# Patient Record
Sex: FEMALE | Race: WHITE | NOT HISPANIC OR LATINO
[De-identification: names, ages, dates, MRNs, and addresses within clinical notes are randomized per-mention and may not be internally consistent; named-entity substitution may affect disease eponyms.]

---

## 2020-08-31 ENCOUNTER — APPOINTMENT (OUTPATIENT)
Dept: OBGYN | Facility: CLINIC | Age: 50
End: 2020-08-31

## 2020-10-06 ENCOUNTER — APPOINTMENT (OUTPATIENT)
Dept: OBGYN | Facility: CLINIC | Age: 50
End: 2020-10-06
Payer: COMMERCIAL

## 2020-10-06 VITALS
DIASTOLIC BLOOD PRESSURE: 80 MMHG | HEIGHT: 69 IN | WEIGHT: 281 LBS | SYSTOLIC BLOOD PRESSURE: 128 MMHG | TEMPERATURE: 98.6 F | BODY MASS INDEX: 41.62 KG/M2

## 2020-10-06 DIAGNOSIS — Z01.419 ENCOUNTER FOR GYNECOLOGICAL EXAMINATION (GENERAL) (ROUTINE) W/OUT ABNORMAL FINDINGS: ICD-10-CM

## 2020-10-06 PROCEDURE — 99396 PREV VISIT EST AGE 40-64: CPT

## 2020-10-06 NOTE — REVIEW OF SYSTEMS
[Fatigue] : fatigue [Heartburn] : heartburn [Abn Vaginal bleeding] : abnormal vaginal bleeding [Dizziness] : dizziness [Sleep Disturbances] : sleep disturbances [Hot Flashes] : hot flashes [Negative] : Heme/Lymph

## 2020-10-06 NOTE — PLAN
[FreeTextEntry1] : Encourage mammo yearly\par Dexa\par Colonoscopy\par \par Discussed bleeding and uterine cancer risk.  PAP done.  FU for uterine sampling and TVUS

## 2020-10-06 NOTE — HISTORY OF PRESENT ILLNESS
[FreeTextEntry1] : Check up.  Last PAP 5 years ago.  Years late for mammo.  Refused genetic cancer screen.  Bleeding q2 weeks x 6 months.  Obese and not exercising.  Social distancing.

## 2020-10-07 LAB
BASOPHILS # BLD AUTO: 0.05 K/UL
BASOPHILS NFR BLD AUTO: 0.5 %
EOSINOPHIL # BLD AUTO: 0.21 K/UL
EOSINOPHIL NFR BLD AUTO: 2.2 %
ESTIMATED AVERAGE GLUCOSE: 108 MG/DL
HBA1C MFR BLD HPLC: 5.4 %
HCT VFR BLD CALC: 43.3 %
HGB BLD-MCNC: 13.4 G/DL
IMM GRANULOCYTES NFR BLD AUTO: 0.3 %
LYMPHOCYTES # BLD AUTO: 2.19 K/UL
LYMPHOCYTES NFR BLD AUTO: 23.4 %
MAN DIFF?: NORMAL
MCHC RBC-ENTMCNC: 28.6 PG
MCHC RBC-ENTMCNC: 30.9 GM/DL
MCV RBC AUTO: 92.5 FL
MONOCYTES # BLD AUTO: 0.6 K/UL
MONOCYTES NFR BLD AUTO: 6.4 %
NEUTROPHILS # BLD AUTO: 6.26 K/UL
NEUTROPHILS NFR BLD AUTO: 67.2 %
PLATELET # BLD AUTO: 408 K/UL
RBC # BLD: 4.68 M/UL
RBC # FLD: 13.2 %
WBC # FLD AUTO: 9.34 K/UL

## 2020-10-09 LAB
ALBUMIN SERPL ELPH-MCNC: 4.5 G/DL
ALP BLD-CCNC: 124 U/L
ALT SERPL-CCNC: 14 U/L
ANION GAP SERPL CALC-SCNC: 16 MMOL/L
AST SERPL-CCNC: 25 U/L
BILIRUB SERPL-MCNC: 0.3 MG/DL
BUN SERPL-MCNC: 12 MG/DL
CALCIUM SERPL-MCNC: 9.6 MG/DL
CHLORIDE SERPL-SCNC: 102 MMOL/L
CO2 SERPL-SCNC: 24 MMOL/L
CREAT SERPL-MCNC: 0.71 MG/DL
DHEA-S SERPL-MCNC: 86.3 UG/DL
ESTRADIOL SERPL-MCNC: 177 PG/ML
FSH SERPL-MCNC: 19.5 IU/L
GLUCOSE SERPL-MCNC: 86 MG/DL
HPV HIGH+LOW RISK DNA PNL CVX: NOT DETECTED
INSULIN SERPL-MCNC: 9.8 UU/ML
LH SERPL-ACNC: 33.8 IU/L
POTASSIUM SERPL-SCNC: 3.9 MMOL/L
PROGEST SERPL-MCNC: 0.6 NG/ML
PROLACTIN SERPL-MCNC: 23.9 NG/ML
PROT SERPL-MCNC: 7.6 G/DL
SODIUM SERPL-SCNC: 141 MMOL/L
T4 FREE SERPL-MCNC: 1.2 NG/DL
TESTOST SERPL-MCNC: 10.4 NG/DL
TSH SERPL-ACNC: 7.21 UIU/ML

## 2020-10-13 LAB — CYTOLOGY CVX/VAG DOC THIN PREP: NORMAL

## 2020-10-27 ENCOUNTER — APPOINTMENT (OUTPATIENT)
Dept: OBGYN | Facility: CLINIC | Age: 50
End: 2020-10-27
Payer: COMMERCIAL

## 2020-10-27 VITALS
DIASTOLIC BLOOD PRESSURE: 68 MMHG | BODY MASS INDEX: 41.47 KG/M2 | HEIGHT: 69 IN | WEIGHT: 280 LBS | SYSTOLIC BLOOD PRESSURE: 120 MMHG

## 2020-10-27 PROCEDURE — 58120 DILATION AND CURETTAGE: CPT

## 2020-10-27 PROCEDURE — 99072 ADDL SUPL MATRL&STAF TM PHE: CPT

## 2020-11-02 LAB — CORE LAB BIOPSY: NORMAL

## 2020-11-09 ENCOUNTER — APPOINTMENT (OUTPATIENT)
Dept: OBGYN | Facility: CLINIC | Age: 50
End: 2020-11-09
Payer: COMMERCIAL

## 2020-11-09 VITALS
HEIGHT: 69 IN | BODY MASS INDEX: 41.03 KG/M2 | WEIGHT: 277 LBS | SYSTOLIC BLOOD PRESSURE: 134 MMHG | DIASTOLIC BLOOD PRESSURE: 68 MMHG

## 2020-11-09 DIAGNOSIS — N92.6 IRREGULAR MENSTRUATION, UNSPECIFIED: ICD-10-CM

## 2020-11-09 DIAGNOSIS — E06.3 AUTOIMMUNE THYROIDITIS: ICD-10-CM

## 2020-11-09 DIAGNOSIS — N84.0 POLYP OF CORPUS UTERI: ICD-10-CM

## 2020-11-09 PROCEDURE — 76830 TRANSVAGINAL US NON-OB: CPT

## 2020-11-09 PROCEDURE — 99072 ADDL SUPL MATRL&STAF TM PHE: CPT

## 2020-11-09 PROCEDURE — 99213 OFFICE O/P EST LOW 20 MIN: CPT | Mod: 25

## 2020-11-10 LAB
BASOPHILS # BLD AUTO: 0.05 K/UL
BASOPHILS NFR BLD AUTO: 0.7 %
EOSINOPHIL # BLD AUTO: 0.19 K/UL
EOSINOPHIL NFR BLD AUTO: 2.7 %
HCT VFR BLD CALC: 45.5 %
HGB BLD-MCNC: 13.8 G/DL
IMM GRANULOCYTES NFR BLD AUTO: 0.3 %
LYMPHOCYTES # BLD AUTO: 1.85 K/UL
LYMPHOCYTES NFR BLD AUTO: 26.5 %
MAN DIFF?: NORMAL
MCHC RBC-ENTMCNC: 28.5 PG
MCHC RBC-ENTMCNC: 30.3 GM/DL
MCV RBC AUTO: 94 FL
MONOCYTES # BLD AUTO: 0.47 K/UL
MONOCYTES NFR BLD AUTO: 6.7 %
NEUTROPHILS # BLD AUTO: 4.41 K/UL
NEUTROPHILS NFR BLD AUTO: 63.1 %
PLATELET # BLD AUTO: 390 K/UL
RBC # BLD: 4.84 M/UL
RBC # FLD: 13.4 %
WBC # FLD AUTO: 6.99 K/UL

## 2020-11-15 PROBLEM — N92.6 IRREGULAR BLEEDING: Status: ACTIVE | Noted: 2020-10-06

## 2020-11-15 PROBLEM — N84.0 ENDOMETRIAL POLYP: Status: ACTIVE | Noted: 2020-11-15

## 2020-11-15 NOTE — PLAN
[FreeTextEntry1] : Discussed bleeding and polyp.  Discussed d and c hysteroscopy with myosure.  Discussed risks of bleeding infection perforation and damage to adjacent organs.  Reviewed tvus consistent with polyp.  \par Reviewed tsh and patient states another md manages her thyroid disease.

## 2020-11-25 ENCOUNTER — NON-APPOINTMENT (OUTPATIENT)
Age: 50
End: 2020-11-25

## 2020-12-04 ENCOUNTER — OUTPATIENT (OUTPATIENT)
Dept: OUTPATIENT SERVICES | Facility: HOSPITAL | Age: 50
LOS: 1 days | End: 2020-12-04

## 2020-12-04 ENCOUNTER — NON-APPOINTMENT (OUTPATIENT)
Age: 50
End: 2020-12-04

## 2020-12-04 VITALS
DIASTOLIC BLOOD PRESSURE: 86 MMHG | WEIGHT: 279.99 LBS | HEIGHT: 68 IN | OXYGEN SATURATION: 99 % | SYSTOLIC BLOOD PRESSURE: 110 MMHG | HEART RATE: 71 BPM | TEMPERATURE: 98 F | RESPIRATION RATE: 16 BRPM

## 2020-12-04 DIAGNOSIS — Z98.89 OTHER SPECIFIED POSTPROCEDURAL STATES: Chronic | ICD-10-CM

## 2020-12-04 DIAGNOSIS — N84.0 POLYP OF CORPUS UTERI: ICD-10-CM

## 2020-12-04 DIAGNOSIS — N93.9 ABNORMAL UTERINE AND VAGINAL BLEEDING, UNSPECIFIED: ICD-10-CM

## 2020-12-04 DIAGNOSIS — Z90.49 ACQUIRED ABSENCE OF OTHER SPECIFIED PARTS OF DIGESTIVE TRACT: Chronic | ICD-10-CM

## 2020-12-04 LAB
ANION GAP SERPL CALC-SCNC: 8 MMO/L — SIGNIFICANT CHANGE UP (ref 7–14)
BUN SERPL-MCNC: 10 MG/DL — SIGNIFICANT CHANGE UP (ref 7–23)
CALCIUM SERPL-MCNC: 9.7 MG/DL — SIGNIFICANT CHANGE UP (ref 8.4–10.5)
CHLORIDE SERPL-SCNC: 101 MMOL/L — SIGNIFICANT CHANGE UP (ref 98–107)
CO2 SERPL-SCNC: 28 MMOL/L — SIGNIFICANT CHANGE UP (ref 22–31)
CREAT SERPL-MCNC: 0.7 MG/DL — SIGNIFICANT CHANGE UP (ref 0.5–1.3)
GLUCOSE SERPL-MCNC: 96 MG/DL — SIGNIFICANT CHANGE UP (ref 70–99)
HCG UR-SCNC: NEGATIVE — SIGNIFICANT CHANGE UP
HCT VFR BLD CALC: 44.2 % — SIGNIFICANT CHANGE UP (ref 34.5–45)
HGB BLD-MCNC: 13.6 G/DL — SIGNIFICANT CHANGE UP (ref 11.5–15.5)
MCHC RBC-ENTMCNC: 28.5 PG — SIGNIFICANT CHANGE UP (ref 27–34)
MCHC RBC-ENTMCNC: 30.8 % — LOW (ref 32–36)
MCV RBC AUTO: 92.5 FL — SIGNIFICANT CHANGE UP (ref 80–100)
NRBC # FLD: 0 K/UL — SIGNIFICANT CHANGE UP (ref 0–0)
PLATELET # BLD AUTO: 361 K/UL — SIGNIFICANT CHANGE UP (ref 150–400)
PMV BLD: 10.1 FL — SIGNIFICANT CHANGE UP (ref 7–13)
POTASSIUM SERPL-MCNC: 3.6 MMOL/L — SIGNIFICANT CHANGE UP (ref 3.5–5.3)
POTASSIUM SERPL-SCNC: 3.6 MMOL/L — SIGNIFICANT CHANGE UP (ref 3.5–5.3)
RBC # BLD: 4.78 M/UL — SIGNIFICANT CHANGE UP (ref 3.8–5.2)
RBC # FLD: 13.2 % — SIGNIFICANT CHANGE UP (ref 10.3–14.5)
SODIUM SERPL-SCNC: 137 MMOL/L — SIGNIFICANT CHANGE UP (ref 135–145)
SP GR UR: 1.02 — SIGNIFICANT CHANGE UP (ref 1–1.04)
WBC # BLD: 6.97 K/UL — SIGNIFICANT CHANGE UP (ref 3.8–10.5)
WBC # FLD AUTO: 6.97 K/UL — SIGNIFICANT CHANGE UP (ref 3.8–10.5)

## 2020-12-04 RX ORDER — SODIUM CHLORIDE 9 MG/ML
1000 INJECTION, SOLUTION INTRAVENOUS
Refills: 0 | Status: DISCONTINUED | OUTPATIENT
Start: 2020-12-10 | End: 2020-12-24

## 2020-12-04 NOTE — H&P PST ADULT - ASSESSMENT
47 yo female with calculus of the gallbladder scheduled for laparoscopic cholecystectomy - possible open on 8/5 with Dr. Pulido 50 yrs old female with h/o uteurine polyp and abnormal and heavy vaginal bleeding, pt had transvaginal sono and biopsy and  presents for preop eval to have D&C hysteroscopy with myosure. Pt also with h/o Hashimoto's disease, laparoscopic cholecystectomy in 2016

## 2020-12-04 NOTE — H&P PST ADULT - NSICDXPROBLEM_GEN_ALL_CORE_FT
PROBLEM DIAGNOSES  Problem: Abnormal vaginal bleeding  Assessment and Plan:       R/O PROBLEM DIAGNOSES  Problem: Snoring  Assessment and Plan:      PROBLEM DIAGNOSES  Problem: Abnormal vaginal bleeding  Assessment and Plan: Pt scheduled for D&C hysteroscopy with myosure.  labs pending,  Preop intructions provided to pt.   Famotidine provided to pt with instructions,  Pt going for COVID testing on 12/7/20      R/O PROBLEM DIAGNOSES  Problem: Snoring  Assessment and Plan: WAYNE precautions recommended.  OR booking notified via fax.

## 2020-12-04 NOTE — H&P PST ADULT - NSICDXPASTMEDICALHX_GEN_ALL_CORE_FT
PAST MEDICAL HISTORY:  Calculus of gallbladder with chronic cholecystitis without obstruction     Elevated TSH     Hashimoto's disease     Obesity, Class III, BMI 40-49.9 (morbid obesity)     Other specified hypothyroidism Been off meds since 2015    Thyroid goiter

## 2020-12-04 NOTE — H&P PST ADULT - NSANTHOSAYNRD_GEN_A_CORE
No. WAYNE screening performed.  STOP BANG Legend: 0-2 = LOW Risk; 3-4 = INTERMEDIATE Risk; 5-8 = HIGH Risk/never tested

## 2020-12-04 NOTE — H&P PST ADULT - NSICDXPASTSURGICALHX_GEN_ALL_CORE_FT
PAST SURGICAL HISTORY:  H/O excision of dermoid cyst from ovary 2006    History of cholecystectomy 2016    S/P bunionectomy Right foot

## 2020-12-04 NOTE — H&P PST ADULT - NSICDXFAMILYHX_GEN_ALL_CORE_FT
FAMILY HISTORY:  Father  Still living? No  Family history of prostate cancer, Age at diagnosis: Age Unknown    Mother  Still living? No  Family history of lung cancer, Age at diagnosis: Age Unknown

## 2020-12-04 NOTE — H&P PST ADULT - GENITOURINARY
negative details… Paramedian Forehead Flap Text: A decision was made to reconstruct the defect utilizing an interpolation axial flap and a staged reconstruction.  A telfa template was made of the defect.  This telfa template was then used to outline the paramedian forehead pedicle flap.  The donor area for the pedicle flap was then injected with anesthesia.  The flap was excised through the skin and subcutaneous tissue down to the layer of the underlying musculature.  The pedicle flap was carefully excised within this deep plane to maintain its blood supply.  The edges of the donor site were undermined.   The donor site was closed in a primary fashion.  The pedicle was then rotated into position and sutured.  Once the tube was sutured into place, adequate blood supply was confirmed with blanching and refill.  The pedicle was then wrapped with xeroform gauze and dressed appropriately with a telfa and gauze bandage to ensure continued blood supply and protect the attached pedicle.

## 2020-12-04 NOTE — H&P PST ADULT - ATTENDING COMMENTS
D and C hysteroscopy myosure for polyp.  Reviewed risks of bleeding infection perforation, damage to adjacent organs.  Questions answered.  Consent signed.

## 2020-12-04 NOTE — H&P PST ADULT - HISTORY OF PRESENT ILLNESS
50 yrs old female with h/o ployp and abnormal vagainal bleeding presents for preop eval to have D&C hysteroscopy with myosure. Pt also with h/o Hashimoto's disease, laparoscopic cholecystectomy in 2016 50 yrs old female with h/o uteurine polyp and abnormal and heavy vaginal bleeding, pt had transvaginal sono and biopsy and  presents for preop eval to have D&C hysteroscopy with myosure. Pt also with h/o Hashimoto's disease, laparoscopic cholecystectomy in 2016

## 2020-12-06 DIAGNOSIS — Z01.818 ENCOUNTER FOR OTHER PREPROCEDURAL EXAMINATION: ICD-10-CM

## 2020-12-07 ENCOUNTER — APPOINTMENT (OUTPATIENT)
Dept: DISASTER EMERGENCY | Facility: CLINIC | Age: 50
End: 2020-12-07

## 2020-12-08 LAB — SARS-COV-2 N GENE NPH QL NAA+PROBE: NOT DETECTED

## 2020-12-09 ENCOUNTER — TRANSCRIPTION ENCOUNTER (OUTPATIENT)
Age: 50
End: 2020-12-09

## 2020-12-09 NOTE — ASU PATIENT PROFILE, ADULT - PMH
Calculus of gallbladder with chronic cholecystitis without obstruction    Elevated TSH    Hashimoto's disease    Obesity, Class III, BMI 40-49.9 (morbid obesity)    Other specified hypothyroidism  Been off meds since 2015  Thyroid goiter

## 2020-12-09 NOTE — ASU PATIENT PROFILE, ADULT - PT NEEDS ASSIST
[Wgt Gain (___ Lbs)] : recent [unfilled] lb weight gain [Fever] : no fever [Eye Redness] : no redness [Ear Pain] : no ear pain [Congested In The Chest] : not feeling ~L congested in the chest [Cough] : no cough [Vomiting] : no vomiting [Seizure] : no seizures [Diarrhea] : no diarrhea [Rash] : no rash [Depression] : no depression no

## 2020-12-09 NOTE — ASU PATIENT PROFILE, ADULT - PSH
H/O excision of dermoid cyst  from ovary 2006  History of cholecystectomy  2016  S/P bunionectomy  Right foot

## 2020-12-09 NOTE — ASU PATIENT PROFILE, ADULT - VISION (WITH CORRECTIVE LENSES IF THE PATIENT USUALLY WEARS THEM):
Normal vision: sees adequately in most situations; can see medication labels, newsprint/uses eye glasses uses eye glasses/Partially impaired: cannot see medication labels or newsprint, but can see obstacles in path, and the surrounding layout; can count fingers at arm's length

## 2020-12-10 ENCOUNTER — RESULT REVIEW (OUTPATIENT)
Age: 50
End: 2020-12-10

## 2020-12-10 ENCOUNTER — APPOINTMENT (OUTPATIENT)
Dept: OBGYN | Facility: CLINIC | Age: 50
End: 2020-12-10

## 2020-12-10 ENCOUNTER — OUTPATIENT (OUTPATIENT)
Dept: OUTPATIENT SERVICES | Facility: HOSPITAL | Age: 50
LOS: 1 days | Discharge: HOME | End: 2020-12-10
Payer: COMMERCIAL

## 2020-12-10 VITALS
HEART RATE: 75 BPM | SYSTOLIC BLOOD PRESSURE: 125 MMHG | TEMPERATURE: 98 F | HEIGHT: 68 IN | WEIGHT: 279.99 LBS | OXYGEN SATURATION: 100 % | DIASTOLIC BLOOD PRESSURE: 64 MMHG | RESPIRATION RATE: 16 BRPM

## 2020-12-10 VITALS — HEART RATE: 78 BPM | OXYGEN SATURATION: 98 % | TEMPERATURE: 98 F | RESPIRATION RATE: 17 BRPM

## 2020-12-10 DIAGNOSIS — Z98.89 OTHER SPECIFIED POSTPROCEDURAL STATES: Chronic | ICD-10-CM

## 2020-12-10 DIAGNOSIS — N84.0 POLYP OF CORPUS UTERI: ICD-10-CM

## 2020-12-10 DIAGNOSIS — Z90.49 ACQUIRED ABSENCE OF OTHER SPECIFIED PARTS OF DIGESTIVE TRACT: Chronic | ICD-10-CM

## 2020-12-10 LAB — HCG UR QL: NEGATIVE — SIGNIFICANT CHANGE UP

## 2020-12-10 PROCEDURE — 88305 TISSUE EXAM BY PATHOLOGIST: CPT | Mod: 26

## 2020-12-10 PROCEDURE — 58558 HYSTEROSCOPY BIOPSY: CPT | Mod: GC

## 2020-12-10 RX ORDER — SODIUM CHLORIDE 9 MG/ML
1000 INJECTION, SOLUTION INTRAVENOUS
Refills: 0 | Status: DISCONTINUED | OUTPATIENT
Start: 2020-12-10 | End: 2020-12-24

## 2020-12-10 NOTE — BRIEF OPERATIVE NOTE - OPERATION/FINDINGS
EUA revealed mobile anteverted uterus. Cervix dilated with prolapsing polyp. Diagnostic hysteroscopy revealed multiple areas of polypoid tissue in cervical canal and NISH. Removed with polyp forceps and gentle curettage. Sent to pathology. Excellent hemostasis noted.

## 2020-12-10 NOTE — ASU DISCHARGE PLAN (ADULT/PEDIATRIC) - CARE PROVIDER_API CALL
Silas Kiser  OBSTETRICS AND GYNECOLOGY  [address], [address] [place] [address]  Phone: [phone]  Fax: [phone]  Follow Up Time:

## 2020-12-10 NOTE — BRIEF OPERATIVE NOTE - NSICDXBRIEFPROCEDURE_GEN_ALL_CORE_FT
PROCEDURES:  Hysteroscopy, with dilation and curettage of uterus 10-Dec-2020 08:14:51  Sandra Frankel

## 2020-12-15 LAB — SURGICAL PATHOLOGY STUDY: SIGNIFICANT CHANGE UP

## 2023-01-12 NOTE — H&P PST ADULT - CONSTITUTIONAL DETAILS
Dr. Stephen Blanca paged for sign out. Pt states he was told he needs a prior authorization for this medicine. Pt states he called last week and has heard nothing back. Please call.    obese/well-developed/well-groomed well-nourished/obese/well-developed/well-groomed

## 2023-06-06 ENCOUNTER — NON-APPOINTMENT (OUTPATIENT)
Age: 53
End: 2023-06-06

## 2023-06-12 ENCOUNTER — APPOINTMENT (OUTPATIENT)
Dept: OBGYN | Facility: CLINIC | Age: 53
End: 2023-06-12
Payer: COMMERCIAL

## 2023-06-12 VITALS
SYSTOLIC BLOOD PRESSURE: 130 MMHG | BODY MASS INDEX: 41.62 KG/M2 | DIASTOLIC BLOOD PRESSURE: 80 MMHG | WEIGHT: 281 LBS | HEIGHT: 69 IN

## 2023-06-12 DIAGNOSIS — Z01.419 ENCOUNTER FOR GYNECOLOGICAL EXAMINATION (GENERAL) (ROUTINE) W/OUT ABNORMAL FINDINGS: ICD-10-CM

## 2023-06-12 DIAGNOSIS — N63.10 UNSPECIFIED LUMP IN THE RIGHT BREAST, UNSPECIFIED QUADRANT: ICD-10-CM

## 2023-06-12 PROCEDURE — 99396 PREV VISIT EST AGE 40-64: CPT

## 2023-06-12 NOTE — REVIEW OF SYSTEMS
[Night Sweats] : night sweats [Heartburn] : heartburn [Genital Rash/Irritation] : genital rash/irritation [Joint Stiffness] : joint stiffness [Sleep Disturbances] : sleep disturbances [Decreased Libido] : decreased libido [Hot Flashes] : hot flashes [Easy Bruising] : easy bruising [Negative] : Heme/Lymph

## 2023-06-12 NOTE — PLAN
[FreeTextEntry1] : Discussed birad 5 of right breast.\par Recommended bx, breast surgeon and breast mri.\par Reviewed likely cancer\par \par Colonoscopy after breast issue addressed.\par \par \par

## 2023-06-14 LAB — HPV HIGH+LOW RISK DNA PNL CVX: NOT DETECTED

## 2023-06-16 ENCOUNTER — RESULT REVIEW (OUTPATIENT)
Age: 53
End: 2023-06-16

## 2023-06-16 ENCOUNTER — APPOINTMENT (OUTPATIENT)
Dept: MAMMOGRAPHY | Facility: CLINIC | Age: 53
End: 2023-06-16
Payer: COMMERCIAL

## 2023-06-16 ENCOUNTER — APPOINTMENT (OUTPATIENT)
Dept: ULTRASOUND IMAGING | Facility: CLINIC | Age: 53
End: 2023-06-16
Payer: COMMERCIAL

## 2023-06-16 ENCOUNTER — OUTPATIENT (OUTPATIENT)
Dept: OUTPATIENT SERVICES | Facility: HOSPITAL | Age: 53
LOS: 1 days | End: 2023-06-16
Payer: COMMERCIAL

## 2023-06-16 ENCOUNTER — TRANSCRIPTION ENCOUNTER (OUTPATIENT)
Age: 53
End: 2023-06-16

## 2023-06-16 DIAGNOSIS — R92.8 OTHER ABNORMAL AND INCONCLUSIVE FINDINGS ON DIAGNOSTIC IMAGING OF BREAST: ICD-10-CM

## 2023-06-16 DIAGNOSIS — Z98.89 OTHER SPECIFIED POSTPROCEDURAL STATES: Chronic | ICD-10-CM

## 2023-06-16 DIAGNOSIS — Z90.49 ACQUIRED ABSENCE OF OTHER SPECIFIED PARTS OF DIGESTIVE TRACT: Chronic | ICD-10-CM

## 2023-06-16 LAB — CYTOLOGY CVX/VAG DOC THIN PREP: NORMAL

## 2023-06-16 PROCEDURE — 19081 BX BREAST 1ST LESION STRTCTC: CPT | Mod: RT

## 2023-06-16 PROCEDURE — 19081 BX BREAST 1ST LESION STRTCTC: CPT

## 2023-06-16 PROCEDURE — 88360 TUMOR IMMUNOHISTOCHEM/MANUAL: CPT | Mod: 26

## 2023-06-16 PROCEDURE — 88342 IMHCHEM/IMCYTCHM 1ST ANTB: CPT | Mod: 26,59

## 2023-06-16 PROCEDURE — A4648: CPT

## 2023-06-16 PROCEDURE — 19083 BX BREAST 1ST LESION US IMAG: CPT | Mod: RT

## 2023-06-16 PROCEDURE — 19083 BX BREAST 1ST LESION US IMAG: CPT

## 2023-06-16 PROCEDURE — 88377 M/PHMTRC ALYS ISHQUANT/SEMIQ: CPT

## 2023-06-16 PROCEDURE — 88377 M/PHMTRC ALYS ISHQUANT/SEMIQ: CPT | Mod: 26

## 2023-06-16 PROCEDURE — 77065 DX MAMMO INCL CAD UNI: CPT

## 2023-06-16 PROCEDURE — 88341 IMHCHEM/IMCYTCHM EA ADD ANTB: CPT | Mod: 26,59

## 2023-06-16 PROCEDURE — 88305 TISSUE EXAM BY PATHOLOGIST: CPT

## 2023-06-16 PROCEDURE — 77065 DX MAMMO INCL CAD UNI: CPT | Mod: 26,RT

## 2023-06-16 PROCEDURE — 88360 TUMOR IMMUNOHISTOCHEM/MANUAL: CPT

## 2023-06-16 PROCEDURE — 88341 IMHCHEM/IMCYTCHM EA ADD ANTB: CPT

## 2023-06-16 PROCEDURE — 88305 TISSUE EXAM BY PATHOLOGIST: CPT | Mod: 26

## 2023-06-16 PROCEDURE — 88342 IMHCHEM/IMCYTCHM 1ST ANTB: CPT

## 2023-06-22 ENCOUNTER — NON-APPOINTMENT (OUTPATIENT)
Age: 53
End: 2023-06-22

## 2023-06-26 ENCOUNTER — NON-APPOINTMENT (OUTPATIENT)
Age: 53
End: 2023-06-26

## 2023-06-27 LAB — SURGICAL PATHOLOGY STUDY: SIGNIFICANT CHANGE UP

## 2023-06-28 ENCOUNTER — NON-APPOINTMENT (OUTPATIENT)
Age: 53
End: 2023-06-28

## 2023-07-01 ENCOUNTER — APPOINTMENT (OUTPATIENT)
Dept: MRI IMAGING | Facility: IMAGING CENTER | Age: 53
End: 2023-07-01
Payer: COMMERCIAL

## 2023-07-01 ENCOUNTER — OUTPATIENT (OUTPATIENT)
Dept: OUTPATIENT SERVICES | Facility: HOSPITAL | Age: 53
LOS: 1 days | End: 2023-07-01
Payer: COMMERCIAL

## 2023-07-01 DIAGNOSIS — C50.911 MALIGNANT NEOPLASM OF UNSPECIFIED SITE OF RIGHT FEMALE BREAST: ICD-10-CM

## 2023-07-01 DIAGNOSIS — Z90.49 ACQUIRED ABSENCE OF OTHER SPECIFIED PARTS OF DIGESTIVE TRACT: Chronic | ICD-10-CM

## 2023-07-01 DIAGNOSIS — Z98.89 OTHER SPECIFIED POSTPROCEDURAL STATES: Chronic | ICD-10-CM

## 2023-07-01 PROCEDURE — C8937: CPT

## 2023-07-01 PROCEDURE — C8908: CPT

## 2023-07-01 PROCEDURE — A9585: CPT

## 2023-07-01 PROCEDURE — 77049 MRI BREAST C-+ W/CAD BI: CPT | Mod: 26

## 2023-07-12 ENCOUNTER — APPOINTMENT (OUTPATIENT)
Dept: PLASTIC SURGERY | Facility: CLINIC | Age: 53
End: 2023-07-12
Payer: COMMERCIAL

## 2023-07-12 ENCOUNTER — APPOINTMENT (OUTPATIENT)
Dept: RADIOLOGY | Facility: CLINIC | Age: 53
End: 2023-07-12
Payer: COMMERCIAL

## 2023-07-12 VITALS
TEMPERATURE: 97.6 F | DIASTOLIC BLOOD PRESSURE: 82 MMHG | HEART RATE: 76 BPM | WEIGHT: 280 LBS | BODY MASS INDEX: 41.47 KG/M2 | SYSTOLIC BLOOD PRESSURE: 135 MMHG | HEIGHT: 69 IN | OXYGEN SATURATION: 96 %

## 2023-07-12 PROCEDURE — 71045 X-RAY EXAM CHEST 1 VIEW: CPT

## 2023-07-12 PROCEDURE — 99245 OFF/OP CONSLTJ NEW/EST HI 55: CPT

## 2023-07-12 NOTE — ASSESSMENT
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  On breast MRI the 2 biopsy-proven sites of malignancy are approximately 3.5 cm apart.\par Clinical stage I.\par \par 1. The surgical treatment options of right Miesha  localization x 2 wide excision lumpectomy, right axillary sentinel lymph node (LN) biopsy, possible right axillary LN dissection followed by post-operative XRT (to reduce risk of local recurrence), vs. right total mastectomy, right axillary sentinel LN biopsy, possible right axillary LN dissection, vs. bilateral total mastectomies, right axillary sentinel LN biopsies, if she should so choose, were discussed in detail. The indications, alternatives, benefits and risks were discussed, including but not limited to bleeding, infection, pain, scar, swelling, numbness, change in breast appearance, recurrence, potential need for additional surgery and lymphedema. The breast cancer booklet and postoperative instructions were reviewed and provided to the patient. \par 2. The lymphedema instruction sheet was reviewed and provided.\par 3. I discussed with her the potential for postoperative radiation therapy.\par 4. I discussed with her the potential for postoperative adjuvant therapy, including the possibility of chemotherapy and/or anti-hormonal therapy as indicated. \par 5. Slide review: to be requested if patient opts to proceed.\par 6. Bilateral breast MRI: completed\par 7. Genetic testing: [facility] comprehensive genetic testing drawn today.  I discussed with her that if the genetic testing should reveal a breast cancer mutation, I will discuss with her the potential for bilateral mastectomies.\par 8. Reconstruction: discussed with her the option of reconstruction if she undergoes mastectomy(ies).\par 9. CXR: Rx provided, to be done preop as screening for distant metastatic cancer.\par 10. Complete Metabolic panel (CMP): drawn today, to evaluate for abnormalities suggestive of distant metastatic disease.\par 11.  I discussed with her that if she should choose to undergo breast conserving therapy, I think she is a candidate for a bilateral oncoplastic reduction/lift.  I provided her the name of Dr. Lauren Shikowitz-Behr.\par 12.  I will contact her with the results of the genetic testing.  She is unsure if she will choose breast conserving therapy with bilateral oncoplastic lift versus mastectomy or bilateral mastectomies.

## 2023-07-12 NOTE — PHYSICAL EXAM
[Normocephalic] : normocephalic [Atraumatic] : atraumatic [EOMI] : extra ocular movement intact [Sclera nonicteric] : sclera nonicteric [Supple] : supple [No Supraclavicular Adenopathy] : no supraclavicular adenopathy [No Thyromegaly] : no thyromegaly [No Cervical Adenopathy] : no cervical adenopathy [Clear to Auscultation Bilat] : clear to auscultation bilaterally [Normal Sinus Rhythm] : normal sinus rhythm [Normal S1, S2] : normal S1 and S2 [No Gallops] : no gallops [No Rubs] : no pericardial rub [Examined in the supine and seated position] : examined in the supine and seated position [Asymmetrical] : asymmetrical [Bra Size: ___] : Bra Size: [unfilled] [No dominant masses] : no dominant masses in right breast  [No dominant masses] : no dominant masses left breast [No Nipple Retraction] : no left nipple retraction [No Nipple Discharge] : no left nipple discharge [No Axillary Lymphadenopathy] : no left axillary lymphadenopathy [No Edema] : no edema [No Rashes] : no rashes [No Ulceration] : no ulceration [de-identified] : bilateral ptosis. right breast is larger than her left [de-identified] : Core biopsy scars x2: Right 10:30, 9.5 cm from the nipple and right 8:30, 11.5 cm from the nipple.

## 2023-07-12 NOTE — CONSULT LETTER
[Dear  ___] : Dear  [unfilled], [Courtesy Letter:] : I had the pleasure of seeing your patient, [unfilled], in my office today. [Please see my note below.] : Please see my note below. [Sincerely,] : Sincerely, [DrRola  ___] : Dr. GUTHRIE

## 2023-07-12 NOTE — HISTORY OF PRESENT ILLNESS
[FreeTextEntry1] : Patient is a 53 year old female here today referred by Dr. Kiser for her recently diagnosed right breast invasive cancer.\par She has a history of Hashimoto's thyroiditis and is on levothyroxine.  She is status post ovarian dermoid cyst excision.\par She denies any known family history of breast cancer.  Her maternal aunt was diagnosed with ovarian cancer in her 60s.  She is unsure if she underwent genetic testing.\par 6/02/2023 Bilateral screening mammogram: \par The right outer mid breast, 6 cm FN and the right outer breast, 8 cm the nipple, demonstrated areas asymmetry with distortion. \par The right upper quadrant, 9 cm FN, demonstrated microcalcifications. \par No suspicious masses, architectural distortion, or significant calcifications are detected in the left breast. Additional imaging recommended. BI-RADS 0\par 6/09/2023 Right diagnostic callback mammogram:\par Additional views demonstrate a persistent spiculated mass at 9:00 axis middle depth, 8 cm from the nipple, with no corresponding suspicious finding on sonographic imaging.  Additional magnification views demonstrate a cluster of pleomorphic calcifications in the central lateral 9:00 axis of middle depth, which is suspicious. These calcifications are situated roughly 2 cm posterolateral and superior to the above spiculated mass.\par Right ultrasound: \par The 0.6 x 0.5 x 0.3 cm spiculated mass at 9.00 8 CMFN corresponds with mammography and is highly suspicious for malignancy. The 0.6 cm cyst at 9:00 10 CMF is benign-appearing. The 0.5 cm and the 0.4 hypoechoic nodules at  9:00 11 CMFN both resemble probably benign complicated cyst. Morphologically benign right axillary lymph nodes are present, without lymphadenopathy. Right breast ultrasound and stereotactic guided core biopsy recommended. BI-RADS 5 \par 6/16/20203 Right, 9 o'clock, 8 cmfn, ultrasound-guided core biopsy and right 9:00 stereotactic biopsy\par Site A (US core biopsy): A wing shaped marking clip was placed. \par Pathology revealed: Invasive moderately differentiated ductal carcinoma. See note.  [place] score 6/9 (3+2+1) in this limited material. Invasive tumor measures at least 3 mm. Ductal carcinoma in situ solid, cribriform, and papillary pattern with intermediate to high grade nuclear atypia and lobular extension. Microcalcifications not present. Lymphovascular permeation by tumor not seen. ER+95%/RI+95%/Her2-\par Site B (stereotactic core biopsy): Right, 9 o'clock, stereotactic vacuum-assisted core biopsy: Pathology revealed: Ductal carcinoma in situ, solid and cribriform pattern with intermediate to high grade nuclear atypia and central necrosis. See note. Microcalcifications present focally in DCIS. Both these results are concordant. Surgical or oncologic management is recommended. \par 7/01/2023 Bilateral MRI: \par Right: There are scattered enhancing nonspecific foci. Susceptibility artifact is seen in association with a 5 mm enhancing nodule in the outer right breast at site of biopsy-proven invasive cancer. Approximately 3.5 cm posterior to this is clip artifact in association with enhancement measuring up to 1.6 cm at site of biopsy-proven ductal carcinoma in situ in association with the clip at site of stereotactic guided biopsy.\par Left: There are scattered enhancing nonspecific foci. There is no suspicious enhancement in the left breast.\par Axilla/other: There is no significant axillary or internal mammary lymphadenopathy. Surgical or oncologic management recommended. BI-RADS 6 \par She is here with her .  Since her recent diagnosis of breast cancer, she has made lifestyle modifications and has lost 11 pounds.\par

## 2023-07-21 ENCOUNTER — NON-APPOINTMENT (OUTPATIENT)
Age: 53
End: 2023-07-21

## 2023-07-28 ENCOUNTER — APPOINTMENT (OUTPATIENT)
Dept: PLASTIC SURGERY | Facility: CLINIC | Age: 53
End: 2023-07-28
Payer: COMMERCIAL

## 2023-07-28 VITALS
DIASTOLIC BLOOD PRESSURE: 81 MMHG | RESPIRATION RATE: 16 BRPM | OXYGEN SATURATION: 99 % | WEIGHT: 277 LBS | BODY MASS INDEX: 41.03 KG/M2 | HEIGHT: 69 IN | HEART RATE: 86 BPM | SYSTOLIC BLOOD PRESSURE: 132 MMHG

## 2023-07-28 PROCEDURE — 99244 OFF/OP CNSLTJ NEW/EST MOD 40: CPT

## 2023-07-31 ENCOUNTER — NON-APPOINTMENT (OUTPATIENT)
Age: 53
End: 2023-07-31

## 2023-08-25 ENCOUNTER — OUTPATIENT (OUTPATIENT)
Dept: OUTPATIENT SERVICES | Facility: HOSPITAL | Age: 53
LOS: 1 days | End: 2023-08-25
Payer: COMMERCIAL

## 2023-08-25 VITALS
DIASTOLIC BLOOD PRESSURE: 76 MMHG | OXYGEN SATURATION: 99 % | HEIGHT: 69 IN | WEIGHT: 271.17 LBS | TEMPERATURE: 98 F | SYSTOLIC BLOOD PRESSURE: 122 MMHG | RESPIRATION RATE: 16 BRPM | HEART RATE: 70 BPM

## 2023-08-25 DIAGNOSIS — C50.811 MALIGNANT NEOPLASM OF OVERLAPPING SITES OF RIGHT FEMALE BREAST: ICD-10-CM

## 2023-08-25 DIAGNOSIS — Z90.49 ACQUIRED ABSENCE OF OTHER SPECIFIED PARTS OF DIGESTIVE TRACT: Chronic | ICD-10-CM

## 2023-08-25 DIAGNOSIS — Z01.818 ENCOUNTER FOR OTHER PREPROCEDURAL EXAMINATION: ICD-10-CM

## 2023-08-25 DIAGNOSIS — E03.8 OTHER SPECIFIED HYPOTHYROIDISM: ICD-10-CM

## 2023-08-25 DIAGNOSIS — Z98.89 OTHER SPECIFIED POSTPROCEDURAL STATES: Chronic | ICD-10-CM

## 2023-08-25 LAB
ANION GAP SERPL CALC-SCNC: 7 MMOL/L — SIGNIFICANT CHANGE UP (ref 5–17)
BUN SERPL-MCNC: 12 MG/DL — SIGNIFICANT CHANGE UP (ref 7–23)
CALCIUM SERPL-MCNC: 9.8 MG/DL — SIGNIFICANT CHANGE UP (ref 8.4–10.5)
CHLORIDE SERPL-SCNC: 104 MMOL/L — SIGNIFICANT CHANGE UP (ref 96–108)
CO2 SERPL-SCNC: 31 MMOL/L — SIGNIFICANT CHANGE UP (ref 22–31)
CREAT SERPL-MCNC: 0.9 MG/DL — SIGNIFICANT CHANGE UP (ref 0.5–1.3)
EGFR: 76 ML/MIN/1.73M2 — SIGNIFICANT CHANGE UP
GLUCOSE SERPL-MCNC: 80 MG/DL — SIGNIFICANT CHANGE UP (ref 70–99)
HCT VFR BLD CALC: 43.4 % — SIGNIFICANT CHANGE UP (ref 34.5–45)
HGB BLD-MCNC: 13.6 G/DL — SIGNIFICANT CHANGE UP (ref 11.5–15.5)
MCHC RBC-ENTMCNC: 28.3 PG — SIGNIFICANT CHANGE UP (ref 27–34)
MCHC RBC-ENTMCNC: 31.3 GM/DL — LOW (ref 32–36)
MCV RBC AUTO: 90.4 FL — SIGNIFICANT CHANGE UP (ref 80–100)
NRBC # BLD: 0 /100 WBCS — SIGNIFICANT CHANGE UP (ref 0–0)
PLATELET # BLD AUTO: 389 K/UL — SIGNIFICANT CHANGE UP (ref 150–400)
POTASSIUM SERPL-MCNC: 3.9 MMOL/L — SIGNIFICANT CHANGE UP (ref 3.5–5.3)
POTASSIUM SERPL-SCNC: 3.9 MMOL/L — SIGNIFICANT CHANGE UP (ref 3.5–5.3)
RBC # BLD: 4.8 M/UL — SIGNIFICANT CHANGE UP (ref 3.8–5.2)
RBC # FLD: 13.3 % — SIGNIFICANT CHANGE UP (ref 10.3–14.5)
SODIUM SERPL-SCNC: 142 MMOL/L — SIGNIFICANT CHANGE UP (ref 135–145)
WBC # BLD: 7.3 K/UL — SIGNIFICANT CHANGE UP (ref 3.8–10.5)
WBC # FLD AUTO: 7.3 K/UL — SIGNIFICANT CHANGE UP (ref 3.8–10.5)

## 2023-08-25 NOTE — H&P PST ADULT - HISTORY OF PRESENT ILLNESS
53 yrs old female with PMHX of hypothyroid who presents to PST with pre-operative diagnosis of malignant neoplasm of right female breast.  Lesion noted in right breast on routine imaging.  Biopsy confirmed malignancy.  Pt denies any breast pain, palpable masses or nipple discharge bilaterally.  Otherwise patient feels well today and denies any acute symptoms.

## 2023-08-25 NOTE — H&P PST ADULT - ATTENDING COMMENTS
The patient is a 53 year old female who was recently diagnosed with right breast invasive cancer and DCIS. She presents to undergo a right eliecer  localization x 2 wide excision lumpectomy, right axillary sentinel lymph node biopsy, possible right axillary lymph node dissection and bilateral oncoplastic reduction.

## 2023-08-25 NOTE — H&P PST ADULT - NSANTHOSAYNRD_GEN_A_CORE
never tested/No. WAYNE screening performed.  STOP BANG Legend: 0-2 = LOW Risk; 3-4 = INTERMEDIATE Risk; 5-8 = HIGH Risk

## 2023-08-25 NOTE — H&P PST ADULT - OPHTHALMOLOGIC COMMENTS
ICU PROGRESS NOTE  [facility] HOSPITALIST SERVICE    Patient: Ge Bhakta Todays Date: 4/8/2020   YOB: 1942 Date of Admission: 4/5/2020   MR Number: [id number] Attending Physician: Ammy Leyva MD     Primary Care Provider:  Rafael Foster MD    Code Status:  Full Resuscitation  Hospital Day: 4  Procedures:  none  Consultants:   Dr. Gibson, psychiatiry  Transfusion: none    Active Issues and Reasons for Visit:  Principal Problem:    Hypertensive emergency  Active Problems:    Dementia (CMS/HCC)    Essential hypertension, benign    Hypertensive encephalopathy    Hallucinations    Crohn's disease (CMS/HCC)    CKD (chronic kidney disease) stage 3, GFR 30-59 ml/min (CMS/HCC)    Hypokalemia    Weight loss    Generalized weakness    Abnormal thyroid function test      Assessment and Plan:  Problem list as noted above.   · Hypertensive emergency with probable encepthallopathy.  Improved.  Mental status is waxing and waning and there is a probable component of hypertensive encephalopathy.  · Chronic hypertension.   Tolerating Lopressor spironolactone and amlodipine thus far.  His blood pressure is still suboptimal except for while sleeping..  · Critical and chronic hypokalemia.  Resolved on replacement and spironolactone.  · Weight loss with generalized weakness and increased sleepiness.  No malignancy noted, possible tumor versus more likely Crohn's ileitis.  · Hallucinations ? Secondary to hypertensive encephalopathy versus dementia with psychotic features versus delirium.  On Haldol.  Appreciate Psychiatry recommendations.  Noted negative RPR and normal vitamin B12 level.  Start melatonin at bedtime and scheduled Tylenol.  · Cerebral microvascular disease.  On statin.  Start daily aspirin 81 mg.  · Concern for underlying dementia.  Noted CT scan from January 2020.   Appreciate Psychiatry recommendations.  · Discordant thyroid function test.  Recommend repeating in 8-12 weeks as an outpatient.  No  indication for treatment right now.    All orders have been entered electronically through [employer].  Patient seen during multidisciplinary rounds with RN and .  Care plan communicated with patient and staff.  Care plan communicated with family.      Subjective:  Ge Bhakta is a 77 year old male who is being seen in follow up for Hypertensive emergency.  The patient was admitted on 04/05/2020 with hypertensive emergency and mental status changes.  Since December 2019 he has had auditory hallucinations where he has had hearing music and then sleeping 20 hours a day and poor appetite and weight loss.  He also has longstanding high blood pressure.  He had some questionable syncopal episodes in December 2019 and was hospitalized at [facility] for 3 days for hypertensive emergency.  He was discharged on spironolactone.  He was compliant with this medication only because his daughter was directly observing his therapy.  All of this history I obtained via his daughter, Nilda.    The patient felt the spironolactone made him weak and he saw the new provider Dr. Rafael Foster in January who discontinued it started clonidine patch.  He then had worsening of his auditory hallucinations such that he was hearing voices and even voices telling him to kill himself and how he should kill himself.  He was still sleeping 20 hours a day and still very weak.  He came to the emergency department here due to his progressive auditory and violent hallucinations.  He was found to have hypertensive emergency and was admitted on at nitroglycerin drip.    He has had persistent hypokalemia with potassium is low as 2.4.  I reviewed his records of [facility] and going back to 2017 he has had hypokalemia.  While at [facility] hospital he did have an aldosterone level of 8 and plasma renin activity level that was less than 0.01.    Hospital day 04/07/2020:  He is not hearing music and having hallucinations right now.  He was  seen in consultation by Dr. Collier from Psychiatry yesterday who started him on scheduled Haldol 1 mg twice daily.  Nitroglycerin drip was discontinued yesterday mid day.  His blood pressure is in the 170-190 systolic while awake otherwise in the 140s to 150 systolic while sleeping.  The patient can tell me he is in the hospital.  He tells me that the other blood pressure medications listed as allergies were discontinued due to itchiness and a rash on his legs.  He has no rash right now.    Hospital day 04/08/2020:  He has not been hearing music or having hallucinations for the last 24 hours.  His blood pressure has been overall better controlled but ranging from the 140s to 190s systolic.  He has been on scheduled Haldol and tolerating that well.  At the time of my interview he is oriented to self only and difficult to redirect and will not answer questions appropriately.  From nursing staff this is all an improvement compared to prior days of hallucinations.    I spoke with his daughter over the phone.  The patient had already called her several hours ago and was very confused and did not know he was in a hospital room and did not know where he was and he was scared.  I asked the daughter regarding these rashes associated with medication and no one else has witnessed these rashes associated with medications.  Patient has not been confused at home and disoriented to place only hallucinating.    Past Medical/Surgical/Social/Family histories reviewed with patient as recorded in the admit H&P (dated 4/5/2020 ).  Meds and Allergies reviewed with patient as recorded in EPIC.    Scheduled Meds:  • magnesium oxide  400 mg Oral BID   • spironolactone  25 mg Oral Daily   • metoPROLOL tartrate  50 mg Oral 2 times per day   • potassium CHLORIDE  20 mEq Oral BID WC   • haloperidol  1 mg Oral BID   • sodium chloride (PF)  2 mL Intracatheter 2 times per day   • heparin (porcine)  5,000 Units Subcutaneous 3 times per day   •  amLODIPine  10 mg Oral Daily     Current IV Infusions:      REVIEW OF SYSTEMS:  The patient denies fevers, chills, night sweats, changes in weight, chest pain, shortness of breath, palpitations, orthostatic symptoms, nausea, vomiting, diarrhea, constipation, blood in the stool or urine, myalgias, arthralgias, anxiety, depression, polyuria, polydipsia, unusual rashes or unusual headaches except as already described in the HPI (History of Present Illness).     OBJECTIVE:     Hemodynamics:   CVP       Vital 24 Hour Range Most Recent Value   Temperature Temp  Min: 97.1 °F (36.2 °C)  Max: 98.3 °F (36.8 °C) 98.3 °F (36.8 °C)   Pulse Pulse  Min: 65  Max: 84 68   Respiratory Resp  Min: 16  Max: 24 18   Blood Pressure BP  Min: 136/78  Max: 192/98 (!) 178/90   Pulse Oximetry SpO2  Min: 96 %  Max: 98 %    O2 No data recorded      Vital Most Recent Value First Value   Weight 65.1 kg Weight: 69.1 kg   Height 5' 2.75\" (159.4 cm) Height: 5' 2.75\" (159.4 cm)       Intake/Output Summary (Last 24 hours) at 4/8/2020 1501  Last data filed at 4/8/2020 1300  Gross per 24 hour   Intake 1380 ml   Output 2900 ml   Net -1520 ml       Physical Exam:  General - awake alert follows commands mild facial plethora and breann in his to his cheeks  Cor - regular no murmurs gallops or rubs  Pulm - clear to auscultation bilaterally without accessory muscle use  Abd - soft regular bowel sounds no guarding no rebound appreciable hepatosplenomegaly  Ext  -  minimal to no hair growth on lower extremities bilaterally strong symmetric radial and pedal pulses no lower extremity edema  Other - no rashes on face scalp upper or lower extremities    Ancillary Studies and Laboratory Results:  CMP  Recent Labs   Lab 04/08/20  0545 04/07/20  1539 04/07/20  1110 04/07/20  0437  04/06/20  1816 04/06/20  0415  04/05/20  0723 04/05/20  0149 04/05/20  0119   SODIUM 145  --   --  141  --  143 144   < > 148*  --  146*   POTASSIUM 4.3 3.8 3.4 3.3*   < > 3.5 2.4*   < > 2.5*   --  3.1*   CHLORIDE 110*  --   --  105  --  105 104   < > 105  --  103   CO2 25  --   --  22  --  28 26   < > 30  --  32   ANIONGAP 14  --   --  17  --  14 16   < > 16  --  14   BUN 23*  --   --  19  --  18 18   < > 22*  --  26*   CREATININE 1.85*  --   --  1.76*  --  1.75* 1.76*   < > 2.00*  --  2.15*   GLUCOSE 108*  --   --  138*  --  133* 127*   < > 114*  --  108*   CALCIUM 9.6  --   --  9.2  --  8.9 8.6   < > 8.9  --  9.0   ALBUMIN 3.7  --   --  4.0  --   --   --   --   --   --  3.9   MG  --   --   --  1.7  --   --  1.8  --  2.3  --  1.6*   AST 20  --   --  25  --   --   --   --   --   --  29   GPT 19  --   --  18  --   --   --   --   --   --  20   ALKPT 111  --   --  108  --   --   --   --   --   --  103   BILIRUBIN 0.4  --   --  0.8  --   --   --   --   --   --  0.5   DBIL  --   --   --  0.1  --   --   --   --   --   --   --    LACTA  --   --   --   --   --   --   --   --   --  1.8  --    TSH  --   --   --   --   --   --   --   --   --   --  7.887*    < > = values in this interval not displayed.     CBC  Recent Labs   Lab 04/08/20  0545 04/06/20  0415 04/05/20  0723 04/05/20  0120   WBC 9.9 9.9 12.7* 12.8*   ANEUT  --   --  9.0* 9.7*   RBC 4.44* 4.25* 4.49* 4.26*   HGB 14.1 13.5 14.3 13.6   HCT 41.8 38.3* 40.7 38.2*    153 166 152     CARDIAC  Recent Labs   Lab 04/05/20  0119   RAPDTR <0.02     ABG  No results found  URINE  Recent Labs   Lab 04/06/20  0802 04/05/20  0159   USPG  --  1.008   UPH  --  7.0   UKET  --  Negative   UPROT  --  Negative   UGLU  --  Negative   UBILI  --  Negative   UROB  --  0.2   UWBC  --  Negative   UNITR  --  Negative   URBC  --  Negative   UK 14.2  --    UCR 43.61  --    UOSM 238  --        IMAGING  I personally reviewed the chest x-ray from admission on 04/05/2020 and there are no clear nodules or acute cardiopulmonary disease    CULTURES   Microbiology Results     None          PENDING LABS and PROCEDURES  Unresulted Labs (From admission, onward)     Start     Ordered     04/08/20 0600  CBC No Differential  AM DRAW,   AMDR      04/07/20 1116    04/08/20 0600  Comprehensive Metabolic Panel  AM DRAW,   AMDR      04/07/20 1116    04/07/20 1106  Hepatic Function Panel  ONE TIME,   Add-On      04/07/20 1105    04/07/20 1100  Potassium  TIMED ONCE,   TD      04/07/20 0218    04/07/20 0731  Free Cortisol, 24 Hour Urine  ONE TIME,   Routine      04/07/20 0730    04/07/20 0600  Aldosterone and Renin Activity Ratio  AM DRAW,   AMDR      04/06/20 1543              Unresulted Procedure (From admission, onward)     Start     Ordered    04/07/20 1108  CT ABDOMEN PELVIS W CONTRAST  1 TIME IMAGING,   Routine      04/07/20 1111                EKG:  Results for orders placed or performed during the hospital encounter of 04/05/20   Electrocardiogram 12-Lead   Result Value Ref Range    Ventricular Rate EKG/Min (BPM) 91     Atrial Rate (BPM) 91     TX-Interval (MSEC) 176     QRS-Interval (MSEC) 88     QT-Interval (MSEC) 376     QTc 463     P Axis (Degrees) 15     R Axis (Degrees) -58     T Axis (Degrees) -26     REPORT TEXT       Normal sinus rhythm  Pulmonary disease pattern  Left anterior fascicular block  Abnormal ECG  When compared with ECG of  05-APR-2020 01:09,  TX interval  has decreased         Ammy Leyva MD  Hospitalist  [facility]   4/8/2020   3:01 PM    uses Reading glasses

## 2023-08-25 NOTE — H&P PST ADULT - OPHTHALMOLOGIC
INR is 2.5 so continue current Coumadin dose and recheck INR in 1 week.
Patient aware and voiced understanding, no concerns voiced at this time.
details…

## 2023-08-28 NOTE — HISTORY OF PRESENT ILLNESS
[FreeTextEntry1] : LEIGHANN HERNANDEZ is a 53 year female presenting to the office today with newly diagnosed right breast invasive cancer. Patient states this was discovered on routine mammography. Patient states she is a candidate for lumpectomy and is currently preferring  oncoplastic reduction with symmetrizing left breast reduction.  Patient admits to back, neck and shoulder pain due to her large breasts. She as well admits to rashes/irritation to her IMF for which she requires to use creams and lotions to treat this.  PMHx- Hashimotos thyroiditis (on synthroid) PSHx- Cholecystectomy, ovarian cystectomy for dermoid tumor Allergies- denies  Denies tobacco use Family history- denies family history of breast cancer but does admit to maternal aunt with ovarian cancer Bra size- 44DD

## 2023-08-28 NOTE — CONSULT LETTER
[Dear  ___] : Dear  [unfilled], [Consult Letter:] : I had the pleasure of evaluating your patient, [unfilled]. [Please see my note below.] : Please see my note below. [Consult Closing:] : Thank you very much for allowing me to participate in the care of this patient.  If you have any questions, please do not hesitate to contact me. [Sincerely,] : Sincerely, [FreeTextEntry2] : Dr. Susan Palleschi [FreeTextEntry3] : Dr. Lauren Shikowitz-Behr, MD Board Certified Plastic and Reconstructive Surgeon [facility]  in Plastic Surgery, [facility]   [address] [place] [id number] [phone]

## 2023-08-28 NOTE — PHYSICAL EXAM
[NI] : Normal [de-identified] : nonlabored breathing  [de-identified] : NAD, AxOx3  [de-identified] : normal HR [de-identified] : Bilateral breasts: no masses, no nipple discharge nor retraction. There is grade 3 ptosis bilaterally. There is breast asymmetry. Breasts are heavy on exam. Lateral fullness is present. There is a RUOQ biopsy site RIGHT: SN-N: 35.5, N-IMF: 14.5, BW: 15 LEFT: SN-N: 33, N-IMF: 18, BW: 15  [de-identified] : no edema  [de-identified] : grossly intact [de-identified] : normal affect

## 2023-08-29 ENCOUNTER — NON-APPOINTMENT (OUTPATIENT)
Age: 53
End: 2023-08-29

## 2023-08-29 PROCEDURE — G0463: CPT

## 2023-08-29 PROCEDURE — 36415 COLL VENOUS BLD VENIPUNCTURE: CPT

## 2023-08-29 PROCEDURE — 85027 COMPLETE CBC AUTOMATED: CPT

## 2023-08-29 PROCEDURE — 80048 BASIC METABOLIC PNL TOTAL CA: CPT

## 2023-08-30 PROBLEM — C50.811 MALIGNANT NEOPLASM OF OVERLAPPING SITES OF RIGHT FEMALE BREAST: Chronic | Status: ACTIVE | Noted: 2023-08-25

## 2023-09-01 ENCOUNTER — NON-APPOINTMENT (OUTPATIENT)
Age: 53
End: 2023-09-01

## 2023-09-08 ENCOUNTER — APPOINTMENT (OUTPATIENT)
Dept: MAMMOGRAPHY | Facility: CLINIC | Age: 53
End: 2023-09-08
Payer: COMMERCIAL

## 2023-09-08 ENCOUNTER — OUTPATIENT (OUTPATIENT)
Dept: OUTPATIENT SERVICES | Facility: HOSPITAL | Age: 53
LOS: 1 days | End: 2023-09-08
Payer: COMMERCIAL

## 2023-09-08 DIAGNOSIS — C50.911 MALIGNANT NEOPLASM OF UNSPECIFIED SITE OF RIGHT FEMALE BREAST: ICD-10-CM

## 2023-09-08 DIAGNOSIS — Z98.89 OTHER SPECIFIED POSTPROCEDURAL STATES: Chronic | ICD-10-CM

## 2023-09-08 DIAGNOSIS — C50.811 MALIGNANT NEOPLASM OF OVERLAPPING SITES OF RIGHT FEMALE BREAST: ICD-10-CM

## 2023-09-08 DIAGNOSIS — Z90.49 ACQUIRED ABSENCE OF OTHER SPECIFIED PARTS OF DIGESTIVE TRACT: Chronic | ICD-10-CM

## 2023-09-08 PROCEDURE — 19281 PERQ DEVICE BREAST 1ST IMAG: CPT

## 2023-09-08 PROCEDURE — 19281 PERQ DEVICE BREAST 1ST IMAG: CPT | Mod: RT

## 2023-09-08 PROCEDURE — C1739: CPT

## 2023-09-11 ENCOUNTER — NON-APPOINTMENT (OUTPATIENT)
Age: 53
End: 2023-09-11

## 2023-09-12 ENCOUNTER — RESULT REVIEW (OUTPATIENT)
Age: 53
End: 2023-09-12

## 2023-09-12 LAB — SURGICAL PATHOLOGY STUDY: SIGNIFICANT CHANGE UP

## 2023-09-13 ENCOUNTER — TRANSCRIPTION ENCOUNTER (OUTPATIENT)
Age: 53
End: 2023-09-13

## 2023-09-13 ENCOUNTER — NON-APPOINTMENT (OUTPATIENT)
Age: 53
End: 2023-09-13

## 2023-09-14 ENCOUNTER — TRANSCRIPTION ENCOUNTER (OUTPATIENT)
Age: 53
End: 2023-09-14

## 2023-09-14 ENCOUNTER — OUTPATIENT (OUTPATIENT)
Dept: OUTPATIENT SERVICES | Facility: HOSPITAL | Age: 53
LOS: 1 days | End: 2023-09-14
Payer: COMMERCIAL

## 2023-09-14 ENCOUNTER — NON-APPOINTMENT (OUTPATIENT)
Age: 53
End: 2023-09-14

## 2023-09-14 ENCOUNTER — APPOINTMENT (OUTPATIENT)
Dept: PLASTIC SURGERY | Facility: HOSPITAL | Age: 53
End: 2023-09-14
Payer: COMMERCIAL

## 2023-09-14 ENCOUNTER — RESULT REVIEW (OUTPATIENT)
Age: 53
End: 2023-09-14

## 2023-09-14 VITALS
DIASTOLIC BLOOD PRESSURE: 69 MMHG | RESPIRATION RATE: 16 BRPM | TEMPERATURE: 97 F | OXYGEN SATURATION: 99 % | SYSTOLIC BLOOD PRESSURE: 139 MMHG | HEART RATE: 73 BPM

## 2023-09-14 VITALS
WEIGHT: 271.17 LBS | TEMPERATURE: 98 F | HEART RATE: 67 BPM | RESPIRATION RATE: 14 BRPM | OXYGEN SATURATION: 97 % | SYSTOLIC BLOOD PRESSURE: 135 MMHG | HEIGHT: 69 IN | DIASTOLIC BLOOD PRESSURE: 76 MMHG

## 2023-09-14 DIAGNOSIS — Z98.89 OTHER SPECIFIED POSTPROCEDURAL STATES: Chronic | ICD-10-CM

## 2023-09-14 DIAGNOSIS — C50.811 MALIGNANT NEOPLASM OF OVERLAPPING SITES OF RIGHT FEMALE BREAST: ICD-10-CM

## 2023-09-14 DIAGNOSIS — Z90.49 ACQUIRED ABSENCE OF OTHER SPECIFIED PARTS OF DIGESTIVE TRACT: Chronic | ICD-10-CM

## 2023-09-14 PROCEDURE — 88342 IMHCHEM/IMCYTCHM 1ST ANTB: CPT

## 2023-09-14 PROCEDURE — 38792 RA TRACER ID OF SENTINL NODE: CPT | Mod: MG

## 2023-09-14 PROCEDURE — 19318 BREAST REDUCTION: CPT | Mod: LT

## 2023-09-14 PROCEDURE — 88304 TISSUE EXAM BY PATHOLOGIST: CPT

## 2023-09-14 PROCEDURE — A9541: CPT

## 2023-09-14 PROCEDURE — 38792 RA TRACER ID OF SENTINL NODE: CPT | Mod: RT

## 2023-09-14 PROCEDURE — C1889: CPT

## 2023-09-14 PROCEDURE — 88305 TISSUE EXAM BY PATHOLOGIST: CPT | Mod: 26

## 2023-09-14 PROCEDURE — C9399: CPT

## 2023-09-14 PROCEDURE — 76098 X-RAY EXAM SURGICAL SPECIMEN: CPT | Mod: 26

## 2023-09-14 PROCEDURE — 38525 BIOPSY/REMOVAL LYMPH NODES: CPT | Mod: RT

## 2023-09-14 PROCEDURE — 88342 IMHCHEM/IMCYTCHM 1ST ANTB: CPT | Mod: 26

## 2023-09-14 PROCEDURE — 19301 PARTIAL MASTECTOMY: CPT | Mod: RT

## 2023-09-14 PROCEDURE — 76098 X-RAY EXAM SURGICAL SPECIMEN: CPT

## 2023-09-14 PROCEDURE — 88307 TISSUE EXAM BY PATHOLOGIST: CPT | Mod: 26

## 2023-09-14 PROCEDURE — 88307 TISSUE EXAM BY PATHOLOGIST: CPT

## 2023-09-14 PROCEDURE — 88305 TISSUE EXAM BY PATHOLOGIST: CPT

## 2023-09-14 PROCEDURE — 88304 TISSUE EXAM BY PATHOLOGIST: CPT | Mod: 26

## 2023-09-14 DEVICE — CLIP APPLIER ETHICON LIGACLIP 9 3/8" SMALL: Type: IMPLANTABLE DEVICE | Site: RIGHT | Status: FUNCTIONAL

## 2023-09-14 DEVICE — ARISTA 3GR: Type: IMPLANTABLE DEVICE | Site: RIGHT | Status: FUNCTIONAL

## 2023-09-14 DEVICE — CLIP APPLIER ETHICON LIGACLIP 11.5" MEDIUM: Type: IMPLANTABLE DEVICE | Site: RIGHT | Status: FUNCTIONAL

## 2023-09-14 RX ORDER — ERGOCALCIFEROL 1.25 MG/1
0 CAPSULE ORAL
Refills: 0 | DISCHARGE

## 2023-09-14 RX ORDER — HYDROMORPHONE HYDROCHLORIDE 2 MG/ML
0.5 INJECTION INTRAMUSCULAR; INTRAVENOUS; SUBCUTANEOUS ONCE
Refills: 0 | Status: DISCONTINUED | OUTPATIENT
Start: 2023-09-14 | End: 2023-09-14

## 2023-09-14 RX ORDER — OXYCODONE HYDROCHLORIDE 5 MG/1
10 TABLET ORAL ONCE
Refills: 0 | Status: DISCONTINUED | OUTPATIENT
Start: 2023-09-14 | End: 2023-09-14

## 2023-09-14 RX ORDER — ONDANSETRON 8 MG/1
4 TABLET, FILM COATED ORAL ONCE
Refills: 0 | Status: COMPLETED | OUTPATIENT
Start: 2023-09-14 | End: 2023-09-14

## 2023-09-14 RX ORDER — SODIUM CHLORIDE 9 MG/ML
1000 INJECTION, SOLUTION INTRAVENOUS
Refills: 0 | Status: DISCONTINUED | OUTPATIENT
Start: 2023-09-14 | End: 2023-09-14

## 2023-09-14 RX ORDER — OXYCODONE HYDROCHLORIDE 5 MG/1
5 TABLET ORAL EVERY 4 HOURS
Refills: 0 | Status: DISCONTINUED | OUTPATIENT
Start: 2023-09-14 | End: 2023-09-14

## 2023-09-14 RX ORDER — HYDROMORPHONE HYDROCHLORIDE 2 MG/ML
1 INJECTION INTRAMUSCULAR; INTRAVENOUS; SUBCUTANEOUS ONCE
Refills: 0 | Status: DISCONTINUED | OUTPATIENT
Start: 2023-09-14 | End: 2023-09-14

## 2023-09-14 RX ORDER — OMEGA-3 ACID ETHYL ESTERS 1 G
0 CAPSULE ORAL
Refills: 0 | DISCHARGE

## 2023-09-14 RX ORDER — IBUPROFEN 200 MG
0 TABLET ORAL
Refills: 0 | DISCHARGE

## 2023-09-14 RX ORDER — ACETAMINOPHEN 500 MG
650 TABLET ORAL ONCE
Refills: 0 | Status: DISCONTINUED | OUTPATIENT
Start: 2023-09-14 | End: 2023-09-28

## 2023-09-14 RX ORDER — DEXTROSE 10 % IN WATER 10 %
0 INTRAVENOUS SOLUTION INTRAVENOUS
Refills: 0 | DISCHARGE

## 2023-09-14 RX ORDER — BROMPHENIRAMIN/PSEUDOEPHEDRINE 10MG-120MG
0 CAPSULE, EXTENDED RELEASE 12 HR ORAL
Refills: 0 | DISCHARGE

## 2023-09-14 RX ORDER — SELENIOUS ACID 40 UG/ML
0 INJECTION, SOLUTION INTRAVENOUS
Refills: 0 | DISCHARGE

## 2023-09-14 RX ORDER — LEVOTHYROXINE SODIUM 125 MCG
1 TABLET ORAL
Refills: 0 | DISCHARGE

## 2023-09-14 RX ORDER — CETIRIZINE HYDROCHLORIDE 10 MG/1
0 TABLET ORAL
Refills: 0 | DISCHARGE

## 2023-09-14 RX ORDER — LEVOTHYROXINE SODIUM 125 MCG
1 TABLET ORAL
Qty: 0 | Refills: 0 | DISCHARGE

## 2023-09-14 RX ADMIN — ONDANSETRON 4 MILLIGRAM(S): 8 TABLET, FILM COATED ORAL at 13:46

## 2023-09-14 RX ADMIN — SODIUM CHLORIDE 50 MILLILITER(S): 9 INJECTION, SOLUTION INTRAVENOUS at 07:05

## 2023-09-14 RX ADMIN — HYDROMORPHONE HYDROCHLORIDE 0.5 MILLIGRAM(S): 2 INJECTION INTRAMUSCULAR; INTRAVENOUS; SUBCUTANEOUS at 12:56

## 2023-09-14 NOTE — BRIEF OPERATIVE NOTE - OPERATION/FINDINGS
Procedure: Right breast oncoplastic, left breast symmetrizing reduction
specimen radiograph revealed 2 clips and 2 eliecer reflectors within specimen

## 2023-09-14 NOTE — ASU PATIENT PROFILE, ADULT - NS PRO ABUSE SCREEN SUSPICION NEGLECT YN
Patient came in today and requested me to forward a message to you in regards to the physician statement she needs completed because you have been helping her do a claim for her sickness from the last time you saw her. She states her insurance is requesting this form to be filled out as well. I scanned it into media and she would like a call when ready for her or if there is an issue. Patient also has [employer] activated if you would like to contact her through there she is aware.     I scanned it into media under attending physician statement.     Thank you,   Leelee   
no

## 2023-09-14 NOTE — ASU DISCHARGE PLAN (ADULT/PEDIATRIC) - NS MD DC FALL RISK RISK
For information on Fall & Injury Prevention, visit: [email or web address] OR  [email or web address] OR  https://www.cdc.gov/steadi/patient.html

## 2023-09-14 NOTE — ASU DISCHARGE PLAN (ADULT/PEDIATRIC) - CARE PROVIDER_API CALL
Shikowitz-Behr, Lauren [facility]  Plastic Surgery  [address] [place] [id number]  Phone: [phone]  Fax: [phone]  Follow Up Time:

## 2023-09-14 NOTE — ASU DISCHARGE PLAN (ADULT/PEDIATRIC) - PROCEDURE
Right breast lumpectomy, right axillary sentinel lymph node biopsy, right breast oncoplastic, left breast symmetrizing reduction

## 2023-09-14 NOTE — ASU PREOP CHECKLIST - MEDICAL/PEDIATRIC CLEARANCE ON MEDICAL RECORD
Luis Mayes (:  1974) is a 49 y.o. male,Established patient, here for evaluation of the following chief complaint(s):  Follow-up         ASSESSMENT/PLAN:  1. Essential hypertension           HTN  bp is high today    bp is good at home  Continue toprol XL 50 mg for now  Monitor bp at home and bring readings at next visit    Counselled to stop smoking. using nicotine gum  Counseled to stop drinking  alcohol. Schedule colonoscopy. Subjective   SUBJECTIVE/OBJECTIVE:  HPI  Is here for follow up of HTN. Has been complaint with meds. Trying to cut down on tobacco and alcohol.     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 Negative for fatigue and fever. Respiratory: Negative for cough, chest tightness, shortness of breath and wheezing. Cardiovascular: Negative for chest pain and palpitations. Gastrointestinal: Negative for abdominal pain, blood in stool, diarrhea, nausea and vomiting. Genitourinary: Negative for dysuria and hematuria. Objective   Physical Exam  Constitutional:       Appearance: He is well-developed. HENT:      Head: Normocephalic and atraumatic. Eyes:      Pupils: Pupils are equal, round, and reactive to light. Neck:      Thyroid: No thyromegaly. Cardiovascular:      Rate and Rhythm: Normal rate and regular rhythm. Heart sounds: Normal heart sounds. No murmur heard. No friction rub. No gallop. Comments: No carotid bruit  Pulmonary:      Effort: Pulmonary effort is normal. No respiratory distress. Breath sounds: Normal breath sounds. No wheezing or rales. Chest:      Chest wall: No tenderness. Abdominal:      General: Bowel sounds are normal. There is no distension. Palpations: Abdomen is soft. There is no mass. Tenderness: There is no abdominal tenderness.  There is no guarding or rebound. Musculoskeletal:      Cervical back: Normal range of motion and neck supple. Neurological:      Mental Status: He is alert and oriented to person, place, and time. An electronic signature was used to authenticate this note.     --Gabriella Lainez MD medical clearance on chart

## 2023-09-14 NOTE — ASU PATIENT PROFILE, ADULT - NSICDXPASTMEDICALHX_GEN_ALL_CORE_FT
PAST MEDICAL HISTORY:  Calculus of gallbladder with chronic cholecystitis without obstruction     Elevated TSH     Hashimoto's disease     Obesity, Class III, BMI 40-49.9 (morbid obesity)     Other specified hypothyroidism Been off meds since 2015    Thyroid goiter      PAST MEDICAL HISTORY:  Calculus of gallbladder with chronic cholecystitis without obstruction     Elevated TSH     Hashimoto's disease     Malignant neoplasm of overlapping sites of right breast     Obesity, Class III, BMI 40-49.9 (morbid obesity)     Other specified hypothyroidism     Thyroid goiter 2023

## 2023-09-14 NOTE — BRIEF OPERATIVE NOTE - SPECIMENS
right breast skin and tissue, left breast skin and tissue, right breast core biopsy site.
right axillary sentinel lymph nodes x 3, right 9:00 lumpectomy

## 2023-09-14 NOTE — BRIEF OPERATIVE NOTE - NSICDXBRIEFPROCEDURE_GEN_ALL_CORE_FT
PROCEDURES:  Reduction of both breasts 14-Sep-2023 12:40:20  Nicci Kirkpatrick  
PROCEDURES:  Lumpectomy of breast with sentinel node biopsy 14-Sep-2023 10:21:51 right 9:00 eliecer  localization x 2 Palleschi, Susan M

## 2023-09-14 NOTE — BRIEF OPERATIVE NOTE - NSICDXBRIEFPREOP_GEN_ALL_CORE_FT
PRE-OP DIAGNOSIS:  Cancer of overlapping sites of right female breast 14-Sep-2023 10:22:54  Palleschi, Susan M  Macromastia 14-Sep-2023 12:40:30  Nicci Kirkpatrick  
PRE-OP DIAGNOSIS:  Cancer of overlapping sites of right female breast 14-Sep-2023 10:22:54  Palleschi, Susan M

## 2023-09-14 NOTE — ASU PATIENT PROFILE, ADULT - FALL HARM RISK - UNIVERSAL INTERVENTIONS
Bed in lowest position, wheels locked, appropriate side rails in place/Call bell, personal items and telephone in reach/Instruct patient to call for assistance before getting out of bed or chair/Non-slip footwear when patient is out of bed/[place] to call system/Physically safe environment - no spills, clutter or unnecessary equipment/Purposeful Proactive Rounding/Room/bathroom lighting operational, light cord in reach

## 2023-09-14 NOTE — BRIEF OPERATIVE NOTE - NSICDXBRIEFPOSTOP_GEN_ALL_CORE_FT
POST-OP DIAGNOSIS:  Cancer of overlapping sites of right female breast 14-Sep-2023 10:23:07  Palleschi, Susan M  Macromastia 14-Sep-2023 12:40:44  Nicci Kirkpatrick  
POST-OP DIAGNOSIS:  Cancer of overlapping sites of right female breast 14-Sep-2023 10:23:07  Palleschi, Susan M

## 2023-09-14 NOTE — ASU DISCHARGE PLAN (ADULT/PEDIATRIC) - ASU DC SPECIAL INSTRUCTIONSFT
Keep incisions clean and dry until seen in the office. SPONGE bathe only  Keep surgical bra in place at all times  No heavy lifting or straining  Empty and record drain outputs twice a day  Ambulate often

## 2023-09-22 ENCOUNTER — APPOINTMENT (OUTPATIENT)
Dept: PLASTIC SURGERY | Facility: CLINIC | Age: 53
End: 2023-09-22
Payer: COMMERCIAL

## 2023-09-22 LAB — SURGICAL PATHOLOGY STUDY: SIGNIFICANT CHANGE UP

## 2023-09-22 PROCEDURE — 99024 POSTOP FOLLOW-UP VISIT: CPT

## 2023-09-26 ENCOUNTER — APPOINTMENT (OUTPATIENT)
Dept: PLASTIC SURGERY | Facility: CLINIC | Age: 53
End: 2023-09-26
Payer: COMMERCIAL

## 2023-09-26 ENCOUNTER — NON-APPOINTMENT (OUTPATIENT)
Age: 53
End: 2023-09-26

## 2023-09-26 VITALS
OXYGEN SATURATION: 98 % | SYSTOLIC BLOOD PRESSURE: 126 MMHG | DIASTOLIC BLOOD PRESSURE: 84 MMHG | HEIGHT: 69 IN | BODY MASS INDEX: 38.8 KG/M2 | TEMPERATURE: 97.6 F | WEIGHT: 262 LBS | HEART RATE: 65 BPM

## 2023-09-26 PROCEDURE — 99024 POSTOP FOLLOW-UP VISIT: CPT

## 2023-10-02 ENCOUNTER — NON-APPOINTMENT (OUTPATIENT)
Age: 53
End: 2023-10-02

## 2023-10-13 ENCOUNTER — APPOINTMENT (OUTPATIENT)
Dept: PLASTIC SURGERY | Facility: CLINIC | Age: 53
End: 2023-10-13
Payer: COMMERCIAL

## 2023-10-13 PROBLEM — Z92.89 HISTORY OF MAMMOGRAM: Status: RESOLVED | Noted: 2023-10-13 | Resolved: 2023-10-13

## 2023-10-13 PROCEDURE — 99024 POSTOP FOLLOW-UP VISIT: CPT

## 2023-10-17 ENCOUNTER — OUTPATIENT (OUTPATIENT)
Dept: OUTPATIENT SERVICES | Facility: HOSPITAL | Age: 53
LOS: 1 days | Discharge: HOME | End: 2023-10-17

## 2023-10-17 DIAGNOSIS — Z90.49 ACQUIRED ABSENCE OF OTHER SPECIFIED PARTS OF DIGESTIVE TRACT: Chronic | ICD-10-CM

## 2023-10-17 DIAGNOSIS — Z98.89 OTHER SPECIFIED POSTPROCEDURAL STATES: Chronic | ICD-10-CM

## 2023-10-17 DIAGNOSIS — C50.919 MALIGNANT NEOPLASM OF UNSPECIFIED SITE OF UNSPECIFIED FEMALE BREAST: ICD-10-CM

## 2023-10-18 ENCOUNTER — APPOINTMENT (OUTPATIENT)
Dept: HEMATOLOGY ONCOLOGY | Facility: CLINIC | Age: 53
End: 2023-10-18
Payer: COMMERCIAL

## 2023-10-18 ENCOUNTER — NON-APPOINTMENT (OUTPATIENT)
Age: 53
End: 2023-10-18

## 2023-10-18 VITALS
BODY MASS INDEX: 39.18 KG/M2 | RESPIRATION RATE: 16 BRPM | SYSTOLIC BLOOD PRESSURE: 110 MMHG | TEMPERATURE: 97.7 F | HEIGHT: 68.98 IN | HEART RATE: 79 BPM | OXYGEN SATURATION: 100 % | DIASTOLIC BLOOD PRESSURE: 73 MMHG | WEIGHT: 264.55 LBS

## 2023-10-18 DIAGNOSIS — Z80.42 FAMILY HISTORY OF MALIGNANT NEOPLASM OF PROSTATE: ICD-10-CM

## 2023-10-18 DIAGNOSIS — Z92.89 PERSONAL HISTORY OF OTHER MEDICAL TREATMENT: ICD-10-CM

## 2023-10-18 DIAGNOSIS — Z98.890 OTHER SPECIFIED POSTPROCEDURAL STATES: ICD-10-CM

## 2023-10-18 DIAGNOSIS — Z80.1 FAMILY HISTORY OF MALIGNANT NEOPLASM OF TRACHEA, BRONCHUS AND LUNG: ICD-10-CM

## 2023-10-18 PROCEDURE — 99244 OFF/OP CNSLTJ NEW/EST MOD 40: CPT

## 2023-10-20 ENCOUNTER — OUTPATIENT (OUTPATIENT)
Dept: OUTPATIENT SERVICES | Facility: HOSPITAL | Age: 53
LOS: 1 days | Discharge: HOME | End: 2023-10-20
Payer: COMMERCIAL

## 2023-10-20 ENCOUNTER — APPOINTMENT (OUTPATIENT)
Dept: RADIATION ONCOLOGY | Facility: CLINIC | Age: 53
End: 2023-10-20
Payer: COMMERCIAL

## 2023-10-20 VITALS
RESPIRATION RATE: 18 BRPM | OXYGEN SATURATION: 100 % | WEIGHT: 265.32 LBS | HEART RATE: 65 BPM | BODY MASS INDEX: 40.21 KG/M2 | TEMPERATURE: 97.5 F | DIASTOLIC BLOOD PRESSURE: 72 MMHG | SYSTOLIC BLOOD PRESSURE: 127 MMHG | HEIGHT: 68 IN

## 2023-10-20 DIAGNOSIS — Z90.49 ACQUIRED ABSENCE OF OTHER SPECIFIED PARTS OF DIGESTIVE TRACT: Chronic | ICD-10-CM

## 2023-10-20 DIAGNOSIS — Z23 ENCOUNTER FOR IMMUNIZATION: ICD-10-CM

## 2023-10-20 DIAGNOSIS — Z98.89 OTHER SPECIFIED POSTPROCEDURAL STATES: Chronic | ICD-10-CM

## 2023-10-20 DIAGNOSIS — Z78.9 OTHER SPECIFIED HEALTH STATUS: ICD-10-CM

## 2023-10-20 DIAGNOSIS — Z80.0 FAMILY HISTORY OF MALIGNANT NEOPLASM OF DIGESTIVE ORGANS: ICD-10-CM

## 2023-10-20 DIAGNOSIS — U07.1 COVID-19: ICD-10-CM

## 2023-10-20 DIAGNOSIS — Z86.39 PERSONAL HISTORY OF OTHER ENDOCRINE, NUTRITIONAL AND METABOLIC DISEASE: ICD-10-CM

## 2023-10-20 PROCEDURE — 99204 OFFICE O/P NEW MOD 45 MIN: CPT | Mod: 25,GC

## 2023-10-20 PROCEDURE — 77263 THER RADIOLOGY TX PLNG CPLX: CPT

## 2023-10-20 RX ORDER — OXYCODONE 5 MG/1
5 TABLET ORAL
Qty: 18 | Refills: 0 | Status: DISCONTINUED | COMMUNITY
Start: 2023-09-09 | End: 2023-10-20

## 2023-10-20 RX ORDER — ONDANSETRON 4 MG/1
4 TABLET, ORALLY DISINTEGRATING ORAL 4 TIMES DAILY
Qty: 9 | Refills: 0 | Status: DISCONTINUED | COMMUNITY
Start: 2023-09-07 | End: 2023-10-20

## 2023-10-20 RX ORDER — CEFADROXIL 500 MG/1
500 CAPSULE ORAL
Qty: 14 | Refills: 0 | Status: DISCONTINUED | COMMUNITY
Start: 2023-09-07 | End: 2023-10-20

## 2023-10-23 DIAGNOSIS — C50.111 MALIGNANT NEOPLASM OF CENTRAL PORTION OF RIGHT FEMALE BREAST: ICD-10-CM

## 2023-10-25 ENCOUNTER — NON-APPOINTMENT (OUTPATIENT)
Age: 53
End: 2023-10-25

## 2023-10-25 PROCEDURE — 77290 THER RAD SIMULAJ FIELD CPLX: CPT | Mod: 26

## 2023-10-25 PROCEDURE — 77334 RADIATION TREATMENT AID(S): CPT | Mod: 26

## 2023-10-27 ENCOUNTER — APPOINTMENT (OUTPATIENT)
Dept: PLASTIC SURGERY | Facility: CLINIC | Age: 53
End: 2023-10-27
Payer: COMMERCIAL

## 2023-10-27 PROCEDURE — 99024 POSTOP FOLLOW-UP VISIT: CPT

## 2023-11-01 PROCEDURE — 77295 3-D RADIOTHERAPY PLAN: CPT | Mod: 26

## 2023-11-01 PROCEDURE — 77334 RADIATION TREATMENT AID(S): CPT | Mod: 26

## 2023-11-01 PROCEDURE — 77300 RADIATION THERAPY DOSE PLAN: CPT | Mod: 26

## 2023-11-02 ENCOUNTER — APPOINTMENT (OUTPATIENT)
Dept: RADIOLOGY | Facility: CLINIC | Age: 53
End: 2023-11-02
Payer: COMMERCIAL

## 2023-11-02 ENCOUNTER — OUTPATIENT (OUTPATIENT)
Dept: OUTPATIENT SERVICES | Facility: HOSPITAL | Age: 53
LOS: 1 days | End: 2023-11-02
Payer: COMMERCIAL

## 2023-11-02 DIAGNOSIS — C50.911 MALIGNANT NEOPLASM OF UNSPECIFIED SITE OF RIGHT FEMALE BREAST: ICD-10-CM

## 2023-11-02 DIAGNOSIS — Z98.89 OTHER SPECIFIED POSTPROCEDURAL STATES: Chronic | ICD-10-CM

## 2023-11-02 DIAGNOSIS — Z92.89 PERSONAL HISTORY OF OTHER MEDICAL TREATMENT: ICD-10-CM

## 2023-11-02 DIAGNOSIS — Z90.49 ACQUIRED ABSENCE OF OTHER SPECIFIED PARTS OF DIGESTIVE TRACT: Chronic | ICD-10-CM

## 2023-11-02 PROCEDURE — 77080 DXA BONE DENSITY AXIAL: CPT

## 2023-11-02 PROCEDURE — 77080 DXA BONE DENSITY AXIAL: CPT | Mod: 26

## 2023-11-03 ENCOUNTER — APPOINTMENT (OUTPATIENT)
Dept: PLASTIC SURGERY | Facility: CLINIC | Age: 53
End: 2023-11-03
Payer: COMMERCIAL

## 2023-11-03 VITALS
DIASTOLIC BLOOD PRESSURE: 84 MMHG | SYSTOLIC BLOOD PRESSURE: 146 MMHG | WEIGHT: 261 LBS | TEMPERATURE: 98 F | HEIGHT: 69 IN | BODY MASS INDEX: 38.66 KG/M2 | OXYGEN SATURATION: 97 % | HEART RATE: 81 BPM

## 2023-11-03 PROCEDURE — 99024 POSTOP FOLLOW-UP VISIT: CPT

## 2023-11-06 ENCOUNTER — NON-APPOINTMENT (OUTPATIENT)
Age: 53
End: 2023-11-06

## 2023-11-06 PROCEDURE — 77280 THER RAD SIMULAJ FIELD SMPL: CPT | Mod: 26

## 2023-11-07 PROCEDURE — G6017: CPT

## 2023-11-08 PROCEDURE — G6017: CPT

## 2023-11-09 PROCEDURE — G6017: CPT

## 2023-11-10 PROBLEM — Z92.3 HISTORY OF RADIATION THERAPY: Status: RESOLVED | Noted: 2023-11-10 | Resolved: 2023-11-10

## 2023-11-10 PROCEDURE — G6017: CPT

## 2023-11-13 ENCOUNTER — NON-APPOINTMENT (OUTPATIENT)
Age: 53
End: 2023-11-13

## 2023-11-13 DIAGNOSIS — Z92.3 PERSONAL HISTORY OF IRRADIATION: ICD-10-CM

## 2023-11-13 PROCEDURE — G6017: CPT

## 2023-11-13 PROCEDURE — 77427 RADIATION TX MANAGEMENT X5: CPT

## 2023-11-14 PROCEDURE — G6017: CPT

## 2023-11-15 PROCEDURE — G6017: CPT

## 2023-11-16 PROCEDURE — G6017: CPT

## 2023-11-17 PROCEDURE — G6017: CPT

## 2023-11-19 PROCEDURE — G6017: CPT

## 2023-11-20 ENCOUNTER — NON-APPOINTMENT (OUTPATIENT)
Age: 53
End: 2023-11-20

## 2023-11-20 PROCEDURE — G6017: CPT

## 2023-11-20 RX ORDER — EMOLLIENT COMBINATION NO.10
EMULSION (GRAM) TOPICAL DAILY
Qty: 0 | Refills: 0 | Status: COMPLETED | OUTPATIENT
Start: 2023-11-20

## 2023-11-21 PROCEDURE — G6017: CPT

## 2023-11-22 PROCEDURE — G6017: CPT

## 2023-11-27 ENCOUNTER — NON-APPOINTMENT (OUTPATIENT)
Age: 53
End: 2023-11-27

## 2023-11-27 PROCEDURE — G6017: CPT

## 2023-11-27 RX ORDER — SILVER SULFADIAZINE 10 MG/G
1 CREAM TOPICAL TWICE DAILY
Qty: 0 | Refills: 0 | Status: COMPLETED | OUTPATIENT
Start: 2023-11-27

## 2023-11-28 PROCEDURE — 77427 RADIATION TX MANAGEMENT X5: CPT

## 2023-11-28 PROCEDURE — G6017: CPT

## 2023-11-29 PROCEDURE — G6017: CPT

## 2023-12-12 ENCOUNTER — NON-APPOINTMENT (OUTPATIENT)
Age: 53
End: 2023-12-12

## 2023-12-22 ENCOUNTER — APPOINTMENT (OUTPATIENT)
Dept: PLASTIC SURGERY | Facility: CLINIC | Age: 53
End: 2023-12-22
Payer: COMMERCIAL

## 2023-12-22 PROCEDURE — 99213 OFFICE O/P EST LOW 20 MIN: CPT

## 2023-12-29 ENCOUNTER — APPOINTMENT (OUTPATIENT)
Dept: RADIATION ONCOLOGY | Facility: CLINIC | Age: 53
End: 2023-12-29
Payer: COMMERCIAL

## 2023-12-29 ENCOUNTER — NON-APPOINTMENT (OUTPATIENT)
Age: 53
End: 2023-12-29

## 2023-12-29 VITALS
DIASTOLIC BLOOD PRESSURE: 80 MMHG | BODY MASS INDEX: 39.84 KG/M2 | RESPIRATION RATE: 16 BRPM | HEART RATE: 74 BPM | WEIGHT: 268.96 LBS | TEMPERATURE: 98.1 F | HEIGHT: 69 IN | OXYGEN SATURATION: 98 % | SYSTOLIC BLOOD PRESSURE: 127 MMHG

## 2023-12-29 PROCEDURE — 99024 POSTOP FOLLOW-UP VISIT: CPT

## 2023-12-29 NOTE — HISTORY OF PRESENT ILLNESS
[FreeTextEntry1] : Ms. WATTS is a 53-year-old female who presented initially in consultation for consideration of radiation therapy on 10/20/2023.   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 Eleven axillary nodes negative (0/11). Oncotype DX Recurrence Score: 12  [facility] Genetic testing negative  HPI:  Last Mammogram was 6-7 years ago.   6/2/23 - Screening mammogram: Two areas of asymmetry in the right breast -right outer mid breast 6 centimeters from the nipple and right outer breast 8 centimeters from the nipple. There were also microcalcifications in the right upper quadrant 9 centimeters of the nipple.  BIRADS 0    6/9/23 - RIGHT Diagnostic Callback Mammogram/US:  0.6 x 0.5 x 0.3cm spiculated mass 9:00 8cm from nipple. Clustered pleomorphic calcifications in the central lateral right breast 9:00. are suspicious. Biopsy recommended. Same day ultrasound correlated with 9:00 eight centimeters from the nipple mass being maximum of 0.6 centimeters. Normal appearing right axilla.   6/16/23 - RIGHT Breast Biopsy x 2  1. Right breast 9:00 Invasive moderately differentiated ductal carcinoma, [place] Score 6/9 (3+2+1), invasive tumor measures at least 3mm.  ER: Positive, 95% GA: Positive, 95% Her2 (IHC): Negative 1+ Her2 CISH : Negative/Not Amplified 2. Right breast 9:00 DCIS intermediate to high grade  Post procedure mammogram confirmed a wing shaped marking clip from US biopsy in the lateral mid breast and a cork shaped marking clip from these stereotactic biopsy in the lateral mid breast. The cork clip is 2.5 centimeters posterior and 1 centimeter lateral to the wing clip.   7/1/23 - Breast MRI: IMPRESSION: Enhancement in association with clip artifacts in the outer right breast  by approximately 3.5 cm on the MRI at site of biopsy-proven invasive cancer and DCIS for which excision to include both clips with bracketing as well as area of distortion and calcifications seen on the post biopsy mammogram dated 6/16/2023 is recommended. BIRADS 6  9/14/23 - Lumpectomy RIGHT BREAST 9:00 and lymph node dissection - (Dr. Palleschi - surgeon)  -Infiltrating ductal carcinoma (IDC), Grade 2 [place] Score 7/9 (3,2,2) -IDC size 6 mm with negative margin, with grade 3 Ductal carcinoma in situ (DCIS), solid and cribriform types with comedo- necrosis and calcifications. DCIS is 3 mm from anterior margin of resection -Focal lobular carcinoma in situ. 0/11 axillary nodes.   Estrogen Receptor (ER) Status: Positive (greater than 10% of cells demonstrate nuclear positivity) Percentage of Cells with Nuclear Positivity: % Progesterone Receptor (PgR) Status: Positive Percentage of Cells with Nuclear Positivity: % HER2 (by immunohistochemistry): Negative (Score 1+) HER2 (by in situ hybridization): Negative (not amplified) pT1bN0 Oncotype DX Recurrence Score:12 She had oncoplastic breast reduction. (Dr. Shikowitz-Behr - plastics)   10/18/20 - saw Dr. Chavez ([facility]) in consultation.  No chemotherapy indicated, recommended Anastrozole to start following completion of radiation therapy.    10/20/23 - presents in consultation for discussion of radiation therapy options.  Ms. Watts is feeling well today and is without pain. She has healed well from her surgery and looks forward to next steps in her treatment.     12/29/2023- Ms. Watts presents today for post treatment evaluation. She completed 4240 cgy of radiation to the right breast over 16 fractions from  11/7-11/29/2023.  Patient states she feels apparently well, except for fatigue, which she feels is gradually resolving. Patient reports new brain fog since   middle of radiation treatment, feels it's worse post radiation. Skin around right nipple is healing, C/D/I  and well approximated she continues to use moisturizer. The patient states she initiated Anastrozole 1 tab at HS 12/25/2023, feels she is tolerating it well, no noted side effects. Last saw her surgical oncologist Dr Shikovitz - Behr 12/22/23, exam stable. The patient is schedule to see her medical oncologist Dr Chavez 01/17/2024. No other interval event nor imaging since last seen.

## 2023-12-29 NOTE — PHYSICAL EXAM
[No UE Edema] : there is no upper extremity edema [de-identified] : BMI 40 [de-identified] : Hyperpigmentation to right breast, recovering

## 2024-01-02 NOTE — PHYSICAL EXAM
[de-identified] : NAd, AxOx3  [de-identified] : nonlabored breathing  [de-identified] : right breast with significant radiation dermatitis and open wound around the areola small residual cutaneous deformity of the left lateral IMF incision [de-identified] : no edema  [de-identified] : as above [de-identified] : normal affect

## 2024-01-02 NOTE — ADDENDUM
[FreeTextEntry1] : I, Nadeem Aguila, documented this note as a scribe on behalf of Dr. Lauren Shikowitz-Behr, MD on 12/22/2023.

## 2024-01-02 NOTE — HISTORY OF PRESENT ILLNESS
[FreeTextEntry1] : Nola Watts is a 53 year female s/p right breast oncoplastic and left symmetrizing breast reduction on 9/14/23. Patient completed radiation therapy and presents for follow-up. Patient does express interest in breast revision surgery

## 2024-01-09 ENCOUNTER — NON-APPOINTMENT (OUTPATIENT)
Age: 54
End: 2024-01-09

## 2024-01-11 ENCOUNTER — OUTPATIENT (OUTPATIENT)
Dept: OUTPATIENT SERVICES | Facility: HOSPITAL | Age: 54
LOS: 1 days | Discharge: HOME | End: 2024-01-11

## 2024-01-11 DIAGNOSIS — Z90.49 ACQUIRED ABSENCE OF OTHER SPECIFIED PARTS OF DIGESTIVE TRACT: Chronic | ICD-10-CM

## 2024-01-11 DIAGNOSIS — Z98.89 OTHER SPECIFIED POSTPROCEDURAL STATES: Chronic | ICD-10-CM

## 2024-01-11 DIAGNOSIS — C50.919 MALIGNANT NEOPLASM OF UNSPECIFIED SITE OF UNSPECIFIED FEMALE BREAST: ICD-10-CM

## 2024-01-15 PROBLEM — C50.911 DUCTAL CARCINOMA OF RIGHT BREAST: Status: ACTIVE | Noted: 2023-06-30

## 2024-01-15 NOTE — OB HISTORY
[Definite:  ___ (Date)] : the last menstrual period was [unfilled] [Menopause Age: ____] : age at menopause was [unfilled] [___] : Living: [unfilled]

## 2024-01-17 ENCOUNTER — APPOINTMENT (OUTPATIENT)
Dept: HEMATOLOGY ONCOLOGY | Facility: CLINIC | Age: 54
End: 2024-01-17
Payer: COMMERCIAL

## 2024-01-17 VITALS
SYSTOLIC BLOOD PRESSURE: 154 MMHG | OXYGEN SATURATION: 97 % | RESPIRATION RATE: 16 BRPM | WEIGHT: 277.78 LBS | BODY MASS INDEX: 41.14 KG/M2 | HEIGHT: 69.02 IN | DIASTOLIC BLOOD PRESSURE: 82 MMHG | HEART RATE: 90 BPM | TEMPERATURE: 96.8 F

## 2024-01-17 DIAGNOSIS — C50.911 MALIGNANT NEOPLASM OF UNSPECIFIED SITE OF RIGHT FEMALE BREAST: ICD-10-CM

## 2024-01-17 PROCEDURE — 99214 OFFICE O/P EST MOD 30 MIN: CPT

## 2024-01-17 NOTE — ASSESSMENT
[FreeTextEntry1] : 12/29/23 Radiation oncology note, 11/3/23 breast surgery note reviewed. 9/2023-Stage IA (T1bN0) RIGHT breast cancer, ER+/[place]+/Her 2-(1+), FISH negative. S/P right BCS/axillary SLND. With Oncotype DX recurrence score of 12, there is a less than 1% group average absolute chemotherapy benefit. She completed 4240 Cgy of radiation to the right breast over 16 fractions from 11/7-11/29/2023. 12/25/2023-Began Anastrozole. Has had significant side effects-+Dry mouth, H/A, nausea, felt like she was "hung over." +Hot flashes. +Brain fog."  Discussed adjuvant endocrine therapy alternatives with respective pros/cons. To hold the anastrozole x 2 weeks.  Should her symptoms improve, she stated she will resume the anastrozole.  Should her symptoms then recur, she will call us, and we can consider change to alternative medication (to then consider exemestane). 11/2/[id number]. Discussed management options in the setting of breast cancer with respective pro's/con's. Patient wishes to continue calcium/vitamin D supplements (and take no other medication at this time), weight bearing exercise, with interval f/u BDT . Next breast imaging per surgeon-due 6/2024.  Patient was given the opportunity to ask questions.  Her questions have been answered to her apparent satisfaction.  She expressed her understanding and willingness to comply with recommended follow-up.  -->RTO 6 months, or earlier as clinically indicated.

## 2024-01-17 NOTE — PHYSICAL EXAM
[Fully active, able to carry on all pre-disease performance without restriction] : Status 0 - Fully active, able to carry on all pre-disease performance without restriction [Normal] : affect appropriate [de-identified] : no dominant mass, nipple retraction or discharge

## 2024-01-17 NOTE — HISTORY OF PRESENT ILLNESS
[Disease: _____________________] : Disease: [unfilled] [T: ___] : T[unfilled] [N: ___] : N[unfilled] [AJCC Stage: ____] : AJCC Stage: [unfilled] [de-identified] : Last prior mammogram was  6-7 years before diagnosis. 6/2/2023-Mammogram-right upper outer quadrant area of asymmetry and right upper quadrant calcifications for which diagnostic mammogram and targeted ultrasound recommended. Follow-up diagnostic right mammogram-at 9:00 8 cm from the nipple, irregular hypoechoic mass with spiculated margins, measuring 0.6 cm with surrounding distortion.  At 9:00 10 cm from the nipple, benign-appearing oval circumscribed mass versus complicated cyst measuring 0.6 cm.  9:00 11 cm from the nipple, benign-appearing oval circumscribed mass versus complicated cyst measuring 0.5 cm.  Anteriorly adjacent similar benign-appearing oval circumscribed mass versus complicated cyst measuring 0.4 cm.  Normal-appearing right axillary lymph nodes.  6/16/2023-Right breast 9:00 8 cm from the nipple ultrasound-guided core biopsy showed invasive moderately differentiated ductal carcinoma, [place] score 6/9.  Invasive tumor measures at least 3 mm.  DCIS solid, cribriform and papillary pattern with intermediate to high-grade nuclear atypia and lobular extension.  No LVI.  ER positive (95% strong)/MT positive (95% strong)/HER2 negative (1+) with FISH negative (non-amplified). Right breast 9:00 biopsy with DCIS, solid and cribriform pattern with intermediate to high grade nuclear atypia and central necrosis.  7/1/2023-Breast MRI-enhancement in association with clip artifact in the outer right breast  by approximately 3.5 cm on the MRI at site of biopsy-proven invasive cancer and DCIS.  No significant axillary or internal mammary lymphadenopathy.  9/26/2023-Status post right breast lumpectomy/right axillary sentinel lymph node dissection with pathology revealing 6 mm infiltrating ductal carcinoma, grade 2, [place] score 7/9.  DCIS, solid and cribriform types with comedonecrosis and calcifications, high nuclear grade.  Focal LCIS.  Margins negative.  11 Axillary sentinel lymph nodes negative.   She completed 4240 cgy of radiation to the right breast over 16 fractions from 11/7-11/29/2023.  12/25/2023-Began Anastrozole.      [de-identified] : Infiltrating ductal carcinoma [de-identified] : ER positive/CO positive/HER2 (1+) negative, FISH negative. [de-identified] : Oncotype Dx. recurrence score of 12. [facility] genetic testing negative-including but not limited to BRCA1/BRCA2. 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 [de-identified] : Completed RT and then began Anastrozole 12/25/23. After beginning AI, +Dry mouth, H/A, nausea, felt like she was "hung over." +Hot flashes. +Brain fog."  Had colonoscopy 2023 due to genetic testing-has MSH3 mutation. Next colonoscopy planned in 3 years.

## 2024-01-17 NOTE — CONSULT LETTER
[Dear  ___] : Dear  [unfilled], [Please see my note below.] : Please see my note below. [Consult Letter:] : I had the pleasure of evaluating your patient, [unfilled]. [Sincerely,] : Sincerely, [Consult Closing:] : Thank you very much for allowing me to participate in the care of this patient.  If you have any questions, please do not hesitate to contact me. [DrRola  ___] : Dr. GUTHRIE [FreeTextEntry3] : Sophia Chavez MD

## 2024-01-23 ENCOUNTER — RX RENEWAL (OUTPATIENT)
Age: 54
End: 2024-01-23

## 2024-03-05 ENCOUNTER — APPOINTMENT (OUTPATIENT)
Dept: PLASTIC SURGERY | Facility: CLINIC | Age: 54
End: 2024-03-05
Payer: COMMERCIAL

## 2024-03-05 ENCOUNTER — NON-APPOINTMENT (OUTPATIENT)
Age: 54
End: 2024-03-05

## 2024-03-05 VITALS
OXYGEN SATURATION: 99 % | BODY MASS INDEX: 41.03 KG/M2 | TEMPERATURE: 98 F | SYSTOLIC BLOOD PRESSURE: 141 MMHG | HEIGHT: 69 IN | WEIGHT: 277 LBS | DIASTOLIC BLOOD PRESSURE: 89 MMHG | HEART RATE: 74 BPM

## 2024-03-05 PROCEDURE — 99213 OFFICE O/P EST LOW 20 MIN: CPT

## 2024-03-05 NOTE — PHYSICAL EXAM
[No Supraclavicular Adenopathy] : no supraclavicular adenopathy [Asymmetrical] : asymmetrical [No dominant masses] : no dominant masses left breast [No Nipple Retraction] : no left nipple retraction [No Nipple Discharge] : no left nipple discharge [No Axillary Lymphadenopathy] : no left axillary lymphadenopathy [No Edema] : no edema [No Rashes] : no rashes [No Ulceration] : no ulceration [de-identified] : Her right breast is cruz than her left. Bilateral breast ad right axillary incisions healed well.  [de-identified] : Post-XRT skin thickening and hyperpigmentation. Residual upper PA scab from Post-XRT skin blister. AOC right axilla c/w small sebaceous cyst, 2 x 2 mm, no e/o infection.

## 2024-03-05 NOTE — ASSESSMENT
[FreeTextEntry1] : S/P right Miesha  localization x 2 lumpectomy, axillary sentinel lymph node biopsy (9/2023) for 6 mm infiltrating ductal carcinoma, grade 2, lymph nodes negative, margins negative, ER+/[place]+/Her2- Stage I No evidence of disease recurrence on CBE   1. Annual bilateral mammogram and breast ultrasound due 6/2024 2. Follow up office visit due 7/2024 3. Advised monthly self breast examinations and advised her to contact me if she has any concerns. 4. Follow up with Dr. Salcedo 5. Follow up with Dr. Stephenson

## 2024-03-05 NOTE — CONSULT LETTER
[FreeTextEntry3] : Xin Arnold, RPA-C Breast Surgery [address] [place] [address], [place] [address] (Phone) [phone] (Fax) [phone]

## 2024-03-05 NOTE — HISTORY OF PRESENT ILLNESS
[FreeTextEntry1] : Patient is a 54 year old female here today for a follow up visit.  She has a history of Hashimoto's thyroiditis and is on Levothyroxine. She is status post ovarian dermoid cyst excision. She denies any known family history of breast cancer. Her maternal aunt was diagnosed with ovarian cancer in her 60s. She is unsure if she underwent genetic testing. 7/12/23 [facility] Genetic testing: MSH3+; she underwent colonoscopy 9/2023 and will undergo her follow-up colonoscopy in 2 years. 9/14/2023 Right Miesha  localization x 2 lumpectomy, right axillary sentinel lymph node biopsy and bilateral oncoplastic reduction: Pathology revealed: Right axillary sentinel lymph node #1-3, biopsy: Seven (11) lymph nodes, no tumor identified Right breast 9:00, lumpectomy: Infiltrating ductal carcinoma (IDC), Grade 2 [place] Score 7/9 (3,2,2). IDC size 6 mm. IDC is 9 mm from the anterior margin of resection. Ductal carcinoma in situ (DCIS), solid and cribriform types with comedo- necrosis and calcifications, high nuclear grade. DCIS is 3 mm from anterior margin of resection. Focal lobular carcinoma in situ. Fibroproliferative changes including radial scars, fibroadenomatoid nodule and adenosis with microcalcifications. Biopsy site reactions. See synoptic summary Left breast skin and tissue, reduction mammoplasty: Benign breast tissue and skin Right breast skin and tissue, reduction mammoplasty: Benign breast tissue and skin Right core biopsy skin scar, excision: Skin, no abnormality seen Oncotype Dx 12 She saw Dr. Stephenson 12/22/2023; receiving scar care to left breast and post-radiation care to right breast. Will undergo revision of dogear deformity of the left lateral IMF incision in the future. She is seeing her again 4/26/2024. Med ONC: Dr. Salcedo. Started Anastrozole on 12/25/2023. She saw her 1/17/2024. Due to significant side effects, patient held Anastrozole x 2 weeks and resumed it the beginning of February. The side effects have returned so she will reach out to Dr. Salcedo. Rad ONC: Dr. Liu. Patient completed 4240 cGy of radiation to the right breast over 16 fractions from 11/7/23 - 11/29/2023. She saw her 12/29/23.  She notes a small nodule in the right armpit for 1 week. No assocaited drainage, redness or tenderness. She still has the outer rightbreast discomfort.

## 2024-03-18 ENCOUNTER — NON-APPOINTMENT (OUTPATIENT)
Age: 54
End: 2024-03-18

## 2024-03-19 ENCOUNTER — APPOINTMENT (OUTPATIENT)
Dept: ORTHOPEDIC SURGERY | Facility: CLINIC | Age: 54
End: 2024-03-19
Payer: COMMERCIAL

## 2024-03-19 VITALS — WEIGHT: 276 LBS | BODY MASS INDEX: 40.88 KG/M2 | HEIGHT: 69 IN

## 2024-03-19 DIAGNOSIS — M16.11 UNILATERAL PRIMARY OSTEOARTHRITIS, RIGHT HIP: ICD-10-CM

## 2024-03-19 PROCEDURE — 73502 X-RAY EXAM HIP UNI 2-3 VIEWS: CPT | Mod: RT

## 2024-03-19 PROCEDURE — 99204 OFFICE O/P NEW MOD 45 MIN: CPT

## 2024-03-19 NOTE — DISCUSSION/SUMMARY
[de-identified] : Discussed findings of today's exam and possible causes of patient's pain.  Educated patient on their most probable diagnosis of chronic intermittent right hip pain with recent atraumatic exacerbation due to underlying moderate osteoarthritis.  Reviewed possible courses of treatment, and we collaboratively decided best course of treatment at this time will include conservative management.  Patient started on a course of oral NSAIDs, prescription given for Mobic (We discussed all possible side effects of this medication).  Patient will be started on a course of physical therapy to restore normal range of motion and strength as tolerated.  If patient has persisting pain may consider cortisone injection at that time.  Follow up as needed.  Patient appreciates and agrees with current plan.  This note was generated using dragon medical dictation software.  A reasonable effort has been made for proofreading its contents, but typos may still remain.  If there are any questions or points of clarification needed please notify my office.

## 2024-03-19 NOTE — HISTORY OF PRESENT ILLNESS
[de-identified] : 54 year old female presents today  with right hip pain x 2 months. The pain is presents after sitting for long time, standing and walking. Motrin is helpful. C/O groin pain. States that she has painful clicking in the groin. Patient was told that she needs a knee replacement on that side and not sure pain is coming from the knee. Patient is currently undergoing breast cancer treatment.

## 2024-03-19 NOTE — PHYSICAL EXAM
[de-identified] : Constitutional: Well-nourished, well-developed, No acute distress, obese Respiratory:  Good respiratory effort, no SOB Lymphatic: No regional lymphadenopathy, no lymphedema Psychiatric: Pleasant and normal affect, alert and oriented x3 Skin: Clean dry and intact B/L LE Musculoskeletal: normal except where as noted in regional exam  Right Hip: APPEARANCE: no marked deformities, no swelling or malalignment POSITIVE TENDERNESS: Hip flexor region, sartorius, proximal rectus femoris NONTENDER: greater trochanter, TFL, gluteal region, ischium/proximal hamstring region, and pubic symphysis.  ROM: Limited in all planes due to pain.  RESISTIVE TESTING: MMT 4+/5 and mild pain with hip flexion, painless resisted ER/IR/SLR/abduction/adduction.  SPECIAL TESTS: + KIARA/FADIR, mild pain with loaded flexion & scouring. neg fulcrum test  [de-identified] : The following radiographs were ordered and read by me during this patient's visit. I reviewed each radiograph in detail with the patient and discussed the findings as highlighted below.   2 views of the right hip were obtained today that show degenerative changes and evidence of moderate osteoarthritis.  No fracture, or dislocation seen at this time. There is no malalignment. No other obvious osseous abnormality. Otherwise unremarkable.

## 2024-04-05 ENCOUNTER — APPOINTMENT (OUTPATIENT)
Dept: PULMONOLOGY | Facility: CLINIC | Age: 54
End: 2024-04-05
Payer: COMMERCIAL

## 2024-04-05 DIAGNOSIS — G47.33 OBSTRUCTIVE SLEEP APNEA (ADULT) (PEDIATRIC): ICD-10-CM

## 2024-04-05 PROCEDURE — 99213 OFFICE O/P EST LOW 20 MIN: CPT

## 2024-04-05 NOTE — REASON FOR VISIT
[Home] : at home, [unfilled] , at the time of the visit. [Medical Office: ([facility])___] : at the medical office located in  [Initial] : an initial visit [Sleep Apnea] : sleep apnea

## 2024-04-05 NOTE — ASSESSMENT
[FreeTextEntry1] :  Discussed effects of untreated WAYNE, including but not limited to: 1) high blood pressure,     2) heart attacks, 3) diabetes, 4) irregular heart beats, 5) strokes, 6) diabetes, 7)     increased risk of death.           Cautioned against driving while sleepy.  Results of sleep study reviewed with patient. Treatment options including weight loss oral appliance therapy and PAP therapy were reviewed with patient. After careful review of sleep study patient desire and risk factors recommend initial therapy with PAP. Will order auto titrating device.  Advised telehealth appointment 1 week after receiving device

## 2024-04-05 NOTE — HISTORY OF PRESENT ILLNESS
[Obstructive Sleep Apnea] : obstructive sleep apnea [Awakes Unrefreshed] : awakes unrefreshed [Frequent Nocturnal Awakening] : frequent nocturnal awakening [Recent  Weight Gain] : recent weight gain [Snoring] : snoring [Tired while Driving] : tired while driving [Witnessed Apneas] : witnessed apneas [TextBox_4] : Patient here for sleep apnea management.  Presented to new cardiologist with concerns regarding her heart and a recent diagnosis of breast cancer.  Presents with symptoms of daytime sleepiness obesity snoring and nonrestorative sleep referred for sleep study [Awakes with Dry Mouth] : does not awaken with dry mouth [Awakes with Headache] : does not awaken with headache [Unusual Movements] : no unusual movements

## 2024-04-18 ENCOUNTER — RX RENEWAL (OUTPATIENT)
Age: 54
End: 2024-04-18

## 2024-04-18 RX ORDER — MELOXICAM 7.5 MG/1
7.5 TABLET ORAL
Qty: 30 | Refills: 0 | Status: ACTIVE | COMMUNITY
Start: 2024-03-19 | End: 1900-01-01

## 2024-04-26 ENCOUNTER — APPOINTMENT (OUTPATIENT)
Dept: PLASTIC SURGERY | Facility: CLINIC | Age: 54
End: 2024-04-26
Payer: COMMERCIAL

## 2024-04-26 DIAGNOSIS — Z17.0 MALIGNANT NEOPLASM OF OVERLAPPING SITES OF RIGHT FEMALE BREAST: ICD-10-CM

## 2024-04-26 DIAGNOSIS — C50.811 MALIGNANT NEOPLASM OF OVERLAPPING SITES OF RIGHT FEMALE BREAST: ICD-10-CM

## 2024-04-26 PROCEDURE — 19380 REVJ RECONSTRUCTED BREAST: CPT

## 2024-04-26 PROCEDURE — 11400 EXC TR-EXT B9+MARG 0.5 CM<: CPT

## 2024-05-03 ENCOUNTER — APPOINTMENT (OUTPATIENT)
Dept: PLASTIC SURGERY | Facility: CLINIC | Age: 54
End: 2024-05-03
Payer: COMMERCIAL

## 2024-05-03 DIAGNOSIS — N62 HYPERTROPHY OF BREAST: ICD-10-CM

## 2024-05-03 PROCEDURE — 99024 POSTOP FOLLOW-UP VISIT: CPT

## 2024-05-06 PROBLEM — N62 MACROMASTIA: Status: ACTIVE | Noted: 2023-07-31

## 2024-05-06 NOTE — END OF VISIT
[FreeTextEntry3] : All medical record entries made by the Scribe were at my, Dr. Lauren Shikowitz-Behr, MD, direction and personally dictated by me on 05/04/2024. I have reviewed the chart and agree that the record accurately reflects my personal performance of the history, physical exam, assessment and plan. I have also personally directed, reviewed, and agreed with the chart.

## 2024-05-06 NOTE — PHYSICAL EXAM
[de-identified] : NAD, AxOx3  [de-identified] : nonlabored breathing  [de-identified] : Left breast incision clean, dry, and intact   no evidence of hematoma, infection or skin breakdown   [de-identified] : no edema  [de-identified] : as above [de-identified] : normal affect

## 2024-05-06 NOTE — ADDENDUM
[FreeTextEntry1] :  I, Zeina Irving, documented this note as a scribe on behalf of Dr. Lauren Shikowitz-Behr, MD on 05/04/2024.

## 2024-05-06 NOTE — HISTORY OF PRESENT ILLNESS
[FreeTextEntry1] : MALLORY HERNANDEZ is a 54 year old female who presents today s/p Revision of left breast scar on 4/26/24. Patient has no complaints today.

## 2024-05-07 PROBLEM — C50.811 MALIGNANT NEOPLASM OF OVERLAPPING SITES OF RIGHT BREAST IN FEMALE, ESTROGEN RECEPTOR POSITIVE: Status: ACTIVE | Noted: 2023-07-12

## 2024-05-07 NOTE — ADDENDUM
[FreeTextEntry1] :  I, Zeina Irving, documented this note as a scribe on behalf of Dr. Lauren Shikowitz-Behr, MD on 04/26/2024.

## 2024-05-07 NOTE — PROCEDURE
[___ ml Inj] : Anesthesia: [unfilled] ~Uml [1%] : 1% [With Epi] : with epinephrine [Betadine] : using betadine [Tolerated Well] : Post Procedure: the patient tolerated the procedure well [None] : None [No Strenuous Activity___Day(s)] : avoid strenuous activity for [unfilled] day(s) [___ Day(s)] : in [unfilled] day(s) [FreeTextEntry1] : Dogear deformity of the left lateral IMF incision [FreeTextEntry2] : Revision of dogear deformity of the left lateral IMF incision [FreeTextEntry3] : local  [FreeTextEntry4] : minimal  [FreeTextEntry5] : none  [FreeTextEntry6] :  MALLORY HERNANDEZ is here for revision of dogear deformity of the left lateral IMF incision. Risks, benefits, and alternatives to the procedure were discussed. Informed consent was obtained. The site was first marked and then injected with 1% lidocaine with epinephrine to achieve anesthesia and vasoconstriction. The site was then cleaned with betadine solution and draped in a sterile fashion.  A #15 blade was then used to incise the elliptical skin marking, and the skin and tissue was removed using a sharp dissecting scissors in an en-bloc fashion. The site was then cleaned with normal saline and closed in layers. The area was dressed with steri-strips. Post-procedure instructions were discussed with the patient who expressed understanding.

## 2024-05-08 ENCOUNTER — RX RENEWAL (OUTPATIENT)
Age: 54
End: 2024-05-08

## 2024-05-08 RX ORDER — ANASTROZOLE TABLETS 1 MG/1
1 TABLET ORAL
Qty: 30 | Refills: 2 | Status: ACTIVE | COMMUNITY
Start: 2023-10-18 | End: 1900-01-01

## 2024-07-08 ENCOUNTER — OUTPATIENT (OUTPATIENT)
Dept: OUTPATIENT SERVICES | Facility: HOSPITAL | Age: 54
LOS: 1 days | Discharge: HOME | End: 2024-07-08

## 2024-07-08 DIAGNOSIS — Z98.89 OTHER SPECIFIED POSTPROCEDURAL STATES: Chronic | ICD-10-CM

## 2024-07-08 DIAGNOSIS — Z90.49 ACQUIRED ABSENCE OF OTHER SPECIFIED PARTS OF DIGESTIVE TRACT: Chronic | ICD-10-CM

## 2024-07-08 DIAGNOSIS — C50.919 MALIGNANT NEOPLASM OF UNSPECIFIED SITE OF UNSPECIFIED FEMALE BREAST: ICD-10-CM

## 2024-07-10 ENCOUNTER — APPOINTMENT (OUTPATIENT)
Dept: HEMATOLOGY ONCOLOGY | Facility: CLINIC | Age: 54
End: 2024-07-10
Payer: COMMERCIAL

## 2024-07-10 VITALS
SYSTOLIC BLOOD PRESSURE: 127 MMHG | DIASTOLIC BLOOD PRESSURE: 74 MMHG | HEART RATE: 80 BPM | RESPIRATION RATE: 16 BRPM | BODY MASS INDEX: 38.58 KG/M2 | TEMPERATURE: 97.2 F | WEIGHT: 261.25 LBS | OXYGEN SATURATION: 99 %

## 2024-07-10 DIAGNOSIS — C50.911 MALIGNANT NEOPLASM OF UNSPECIFIED SITE OF RIGHT FEMALE BREAST: ICD-10-CM

## 2024-07-10 DIAGNOSIS — Z79.811 LONG TERM (CURRENT) USE OF AROMATASE INHIBITORS: ICD-10-CM

## 2024-07-10 PROCEDURE — 99214 OFFICE O/P EST MOD 30 MIN: CPT

## 2024-07-10 PROCEDURE — G2211 COMPLEX E/M VISIT ADD ON: CPT

## 2024-07-10 RX ORDER — EXEMESTANE 25 MG/1
25 TABLET, FILM COATED ORAL
Qty: 30 | Refills: 0 | Status: ACTIVE | COMMUNITY
Start: 2024-07-10 | End: 1900-01-01

## 2024-07-12 ENCOUNTER — RESULT REVIEW (OUTPATIENT)
Age: 54
End: 2024-07-12

## 2024-07-12 ENCOUNTER — APPOINTMENT (OUTPATIENT)
Dept: ULTRASOUND IMAGING | Facility: CLINIC | Age: 54
End: 2024-07-12
Payer: COMMERCIAL

## 2024-07-12 ENCOUNTER — APPOINTMENT (OUTPATIENT)
Dept: PLASTIC SURGERY | Facility: CLINIC | Age: 54
End: 2024-07-12
Payer: COMMERCIAL

## 2024-07-12 ENCOUNTER — APPOINTMENT (OUTPATIENT)
Dept: MAMMOGRAPHY | Facility: CLINIC | Age: 54
End: 2024-07-12
Payer: COMMERCIAL

## 2024-07-12 DIAGNOSIS — C50.811 MALIGNANT NEOPLASM OF OVERLAPPING SITES OF RIGHT FEMALE BREAST: ICD-10-CM

## 2024-07-12 DIAGNOSIS — Z17.0 MALIGNANT NEOPLASM OF OVERLAPPING SITES OF RIGHT FEMALE BREAST: ICD-10-CM

## 2024-07-12 PROCEDURE — G0279: CPT

## 2024-07-12 PROCEDURE — 99212 OFFICE O/P EST SF 10 MIN: CPT

## 2024-07-12 PROCEDURE — 77066 DX MAMMO INCL CAD BI: CPT

## 2024-07-12 PROCEDURE — 76641 ULTRASOUND BREAST COMPLETE: CPT | Mod: 50

## 2024-09-19 NOTE — ASU PREOP CHECKLIST - MUPIRONCIN COMMENTS
Thoracentesis Procedure Note    Indication: Moderate left sided pleural effusion    Consent: Yes, placed in chart.    Procedure: The patient was placed in the sitting position and the appropriate landmarks were identified.  Ultrasound was done and pleural effusion confirmed left and the site marked the skin over the puncture site in the left posterior chest wall was prepped with ChloraPrep and draped in sterile fashion. Local anesthesia was applied with 1% lidocaine. Thoracentesis catheter was inserted with needle guidance in the sixth intercostal space above the rib margin posteriorly. Pleural fluid was straw-colored, drained 1400 mL fluid. The catheter was then withdrawn and a sterile dressing was placed over the site. A post-procedure film is pending at this time.    The patient tolerated the procedure well.    Complications: None    Electronically signed by Fred Leon MD, 09/19/24, 10:06 AM EDT.    
n/a

## 2024-09-29 ENCOUNTER — OUTPATIENT (OUTPATIENT)
Dept: OUTPATIENT SERVICES | Facility: HOSPITAL | Age: 54
LOS: 1 days | Discharge: HOME | End: 2024-09-29

## 2024-09-29 DIAGNOSIS — Z98.89 OTHER SPECIFIED POSTPROCEDURAL STATES: Chronic | ICD-10-CM

## 2024-09-29 DIAGNOSIS — C50.919 MALIGNANT NEOPLASM OF UNSPECIFIED SITE OF UNSPECIFIED FEMALE BREAST: ICD-10-CM

## 2024-09-29 DIAGNOSIS — Z90.49 ACQUIRED ABSENCE OF OTHER SPECIFIED PARTS OF DIGESTIVE TRACT: Chronic | ICD-10-CM

## 2024-10-04 DIAGNOSIS — Z92.89 PERSONAL HISTORY OF OTHER MEDICAL TREATMENT: ICD-10-CM

## 2024-10-09 ENCOUNTER — APPOINTMENT (OUTPATIENT)
Dept: HEMATOLOGY ONCOLOGY | Facility: CLINIC | Age: 54
End: 2024-10-09

## 2024-10-09 DIAGNOSIS — C50.911 MALIGNANT NEOPLASM OF UNSPECIFIED SITE OF RIGHT FEMALE BREAST: ICD-10-CM

## 2024-10-09 DIAGNOSIS — Z79.811 LONG TERM (CURRENT) USE OF AROMATASE INHIBITORS: ICD-10-CM

## 2024-11-13 ENCOUNTER — APPOINTMENT (OUTPATIENT)
Dept: PLASTIC SURGERY | Facility: CLINIC | Age: 54
End: 2024-11-13
Payer: COMMERCIAL

## 2024-11-13 VITALS
SYSTOLIC BLOOD PRESSURE: 130 MMHG | WEIGHT: 235 LBS | BODY MASS INDEX: 34.8 KG/M2 | DIASTOLIC BLOOD PRESSURE: 83 MMHG | OXYGEN SATURATION: 98 % | HEART RATE: 72 BPM | TEMPERATURE: 96.3 F | HEIGHT: 69 IN

## 2024-11-13 DIAGNOSIS — Z17.0 MALIGNANT NEOPLASM OF OVERLAPPING SITES OF RIGHT FEMALE BREAST: ICD-10-CM

## 2024-11-13 DIAGNOSIS — C50.811 MALIGNANT NEOPLASM OF OVERLAPPING SITES OF RIGHT FEMALE BREAST: ICD-10-CM

## 2024-11-13 PROCEDURE — 99213 OFFICE O/P EST LOW 20 MIN: CPT

## 2024-11-19 NOTE — ASU DISCHARGE PLAN (ADULT/PEDIATRIC) - FREQUENT HAND WASHING PREVENTS THE SPREAD OF INFECTION.
How Did The Hair Loss Occur?: gradual in onset
Additional History: Pt states she has dealing with hair loss for 10+ years. Pt states it is getting worse over time. Pt states hair loss runs in family. Pt states she has not tried any medications for hair loss. Pt states she started taking prenatal yesterday
Statement Selected

## 2024-11-26 ENCOUNTER — OUTPATIENT (OUTPATIENT)
Dept: OUTPATIENT SERVICES | Facility: HOSPITAL | Age: 54
LOS: 1 days | Discharge: HOME | End: 2024-11-26

## 2024-11-26 DIAGNOSIS — Z90.49 ACQUIRED ABSENCE OF OTHER SPECIFIED PARTS OF DIGESTIVE TRACT: Chronic | ICD-10-CM

## 2024-11-26 DIAGNOSIS — C50.919 MALIGNANT NEOPLASM OF UNSPECIFIED SITE OF UNSPECIFIED FEMALE BREAST: ICD-10-CM

## 2024-11-26 DIAGNOSIS — Z98.89 OTHER SPECIFIED POSTPROCEDURAL STATES: Chronic | ICD-10-CM

## 2024-12-02 ENCOUNTER — APPOINTMENT (OUTPATIENT)
Dept: HEMATOLOGY ONCOLOGY | Facility: CLINIC | Age: 54
End: 2024-12-02
Payer: COMMERCIAL

## 2024-12-02 VITALS
WEIGHT: 240.08 LBS | DIASTOLIC BLOOD PRESSURE: 83 MMHG | BODY MASS INDEX: 35.45 KG/M2 | HEART RATE: 72 BPM | RESPIRATION RATE: 16 BRPM | SYSTOLIC BLOOD PRESSURE: 132 MMHG | OXYGEN SATURATION: 98 % | TEMPERATURE: 97.2 F

## 2024-12-02 DIAGNOSIS — Z79.811 LONG TERM (CURRENT) USE OF AROMATASE INHIBITORS: ICD-10-CM

## 2024-12-02 DIAGNOSIS — C50.911 MALIGNANT NEOPLASM OF UNSPECIFIED SITE OF RIGHT FEMALE BREAST: ICD-10-CM

## 2024-12-02 PROCEDURE — 99214 OFFICE O/P EST MOD 30 MIN: CPT

## 2024-12-02 PROCEDURE — G2211 COMPLEX E/M VISIT ADD ON: CPT | Mod: NC

## 2024-12-02 RX ORDER — TIRZEPATIDE 7.5 MG/.5ML
7.5 INJECTION, SOLUTION SUBCUTANEOUS
Qty: 2 | Refills: 0 | Status: ACTIVE | COMMUNITY
Start: 2024-11-29

## 2025-05-13 ENCOUNTER — APPOINTMENT (OUTPATIENT)
Dept: PLASTIC SURGERY | Facility: CLINIC | Age: 55
End: 2025-05-13
Payer: COMMERCIAL

## 2025-05-13 VITALS
BODY MASS INDEX: 35.55 KG/M2 | HEART RATE: 75 BPM | OXYGEN SATURATION: 99 % | SYSTOLIC BLOOD PRESSURE: 124 MMHG | TEMPERATURE: 97.7 F | HEIGHT: 69 IN | WEIGHT: 240 LBS | DIASTOLIC BLOOD PRESSURE: 77 MMHG

## 2025-05-13 DIAGNOSIS — Z17.0 MALIGNANT NEOPLASM OF OVERLAPPING SITES OF RIGHT FEMALE BREAST: ICD-10-CM

## 2025-05-13 DIAGNOSIS — C50.811 MALIGNANT NEOPLASM OF OVERLAPPING SITES OF RIGHT FEMALE BREAST: ICD-10-CM

## 2025-05-13 PROCEDURE — 99213 OFFICE O/P EST LOW 20 MIN: CPT

## 2025-05-29 ENCOUNTER — OUTPATIENT (OUTPATIENT)
Dept: OUTPATIENT SERVICES | Facility: HOSPITAL | Age: 55
LOS: 1 days | Discharge: HOME | End: 2025-05-29

## 2025-05-29 DIAGNOSIS — Z98.89 OTHER SPECIFIED POSTPROCEDURAL STATES: Chronic | ICD-10-CM

## 2025-05-29 DIAGNOSIS — C50.919 MALIGNANT NEOPLASM OF UNSPECIFIED SITE OF UNSPECIFIED FEMALE BREAST: ICD-10-CM

## 2025-05-29 DIAGNOSIS — Z90.49 ACQUIRED ABSENCE OF OTHER SPECIFIED PARTS OF DIGESTIVE TRACT: Chronic | ICD-10-CM

## 2025-06-03 ENCOUNTER — NON-APPOINTMENT (OUTPATIENT)
Age: 55
End: 2025-06-03

## 2025-06-03 ENCOUNTER — APPOINTMENT (OUTPATIENT)
Dept: HEMATOLOGY ONCOLOGY | Facility: CLINIC | Age: 55
End: 2025-06-03
Payer: COMMERCIAL

## 2025-06-03 VITALS
RESPIRATION RATE: 18 BRPM | TEMPERATURE: 97.3 F | DIASTOLIC BLOOD PRESSURE: 79 MMHG | BODY MASS INDEX: 34.07 KG/M2 | HEIGHT: 69 IN | WEIGHT: 230 LBS | SYSTOLIC BLOOD PRESSURE: 133 MMHG | HEART RATE: 73 BPM | OXYGEN SATURATION: 98 %

## 2025-06-03 DIAGNOSIS — C50.911 MALIGNANT NEOPLASM OF UNSPECIFIED SITE OF RIGHT FEMALE BREAST: ICD-10-CM

## 2025-06-03 DIAGNOSIS — Z79.811 LONG TERM (CURRENT) USE OF AROMATASE INHIBITORS: ICD-10-CM

## 2025-06-03 PROCEDURE — G2211 COMPLEX E/M VISIT ADD ON: CPT | Mod: NC

## 2025-06-03 PROCEDURE — 99214 OFFICE O/P EST MOD 30 MIN: CPT

## 2025-07-10 RX ORDER — LETROZOLE TABLETS 2.5 MG/1
2.5 TABLET, FILM COATED ORAL
Qty: 30 | Refills: 2 | Status: ACTIVE | COMMUNITY
Start: 2025-07-10 | End: 1900-01-01

## (undated) DEVICE — SPECIMEN CONTAINER 100ML

## (undated) DEVICE — DRSG TEGADERM 1.75X1.75"

## (undated) DEVICE — SOL IRR POUR H2O 250ML

## (undated) DEVICE — LAP PAD 18 X 18"

## (undated) DEVICE — SUT PLAIN GUT FAST ABSORBING 5-0 PC-1

## (undated) DEVICE — DRAIN RESERVOIR FOR JACKSON PRATT 100CC CARDINAL

## (undated) DEVICE — ELCTR BOVIE TIP BLADE INSULATED 2.75" EDGE

## (undated) DEVICE — ONETRAC LIGHTED RETRACTOR 135 X 30MM DISP

## (undated) DEVICE — DRSG DERMABOND 0.7ML

## (undated) DEVICE — Device

## (undated) DEVICE — NDL HYPO SAFE 22G X 1.5" (BLACK)

## (undated) DEVICE — DRAPE INSTRUMENT POUCH 6.75" X 11"

## (undated) DEVICE — SOL IRR POUR NS 0.9% 500ML

## (undated) DEVICE — WARMING BLANKET LOWER ADULT

## (undated) DEVICE — DRSG STERISTRIPS 0.5 X 4"

## (undated) DEVICE — DRAPE THYROID 77" X 123"

## (undated) DEVICE — PACK MINOR

## (undated) DEVICE — MEDICATION LABELS W MARKER

## (undated) DEVICE — PREP BETADINE KIT

## (undated) DEVICE — VENODYNE/SCD SLEEVE CALF MEDIUM

## (undated) DEVICE — DRAPE CHEST BREAST 106" X 122"

## (undated) DEVICE — SAVI SCOUT HANDPIECE

## (undated) DEVICE — DRSG TEGADERM + PAD 3.5X4"

## (undated) DEVICE — SPONGE PEANUT AUTO COUNT

## (undated) DEVICE — SUT POLYSORB 4-0 18" P-12 UNDYED

## (undated) DEVICE — SUT POLYSORB 3-0 30" V-20 UNDYED

## (undated) DEVICE — DRAPE TOWEL BLUE 17" X 24"

## (undated) DEVICE — SUT SOFSILK 2-0 18" C-23

## (undated) DEVICE — DRSG TELFA 3 X 8

## (undated) DEVICE — DRAIN JACKSON PRATT 10MM FLAT FULL 15FR TROCAR